# Patient Record
Sex: MALE | Race: WHITE | NOT HISPANIC OR LATINO | Employment: FULL TIME | URBAN - METROPOLITAN AREA
[De-identification: names, ages, dates, MRNs, and addresses within clinical notes are randomized per-mention and may not be internally consistent; named-entity substitution may affect disease eponyms.]

---

## 2017-02-09 ENCOUNTER — OFFICE VISIT - RIVER FALLS (OUTPATIENT)
Dept: FAMILY MEDICINE | Facility: CLINIC | Age: 52
End: 2017-02-09

## 2017-02-09 ASSESSMENT — MIFFLIN-ST. JEOR: SCORE: 1923.53

## 2017-03-20 ENCOUNTER — OFFICE VISIT - RIVER FALLS (OUTPATIENT)
Dept: FAMILY MEDICINE | Facility: CLINIC | Age: 52
End: 2017-03-20

## 2017-03-28 ENCOUNTER — OFFICE VISIT - RIVER FALLS (OUTPATIENT)
Dept: FAMILY MEDICINE | Facility: CLINIC | Age: 52
End: 2017-03-28

## 2017-03-28 ASSESSMENT — MIFFLIN-ST. JEOR: SCORE: 1916.27

## 2017-10-19 ENCOUNTER — OFFICE VISIT - RIVER FALLS (OUTPATIENT)
Dept: FAMILY MEDICINE | Facility: CLINIC | Age: 52
End: 2017-10-19

## 2017-10-19 ASSESSMENT — MIFFLIN-ST. JEOR: SCORE: 1925.35

## 2019-01-09 ENCOUNTER — OFFICE VISIT - RIVER FALLS (OUTPATIENT)
Dept: FAMILY MEDICINE | Facility: CLINIC | Age: 54
End: 2019-01-09

## 2019-01-09 ASSESSMENT — MIFFLIN-ST. JEOR: SCORE: 1779.29

## 2019-01-10 LAB
BUN SERPL-MCNC: 13 MG/DL (ref 7–25)
BUN/CREAT RATIO - HISTORICAL: ABNORMAL (ref 6–22)
CALCIUM SERPL-MCNC: 9.9 MG/DL (ref 8.6–10.3)
CHLORIDE BLD-SCNC: 96 MMOL/L (ref 98–110)
CO2 SERPL-SCNC: 29 MMOL/L (ref 20–32)
CREAT SERPL-MCNC: 0.96 MG/DL (ref 0.7–1.33)
EGFRCR SERPLBLD CKD-EPI 2021: 91 ML/MIN/1.73M2
GLUCOSE BLD-MCNC: 328 MG/DL (ref 65–99)
POTASSIUM BLD-SCNC: 3.8 MMOL/L (ref 3.5–5.3)
SODIUM SERPL-SCNC: 135 MMOL/L (ref 135–146)

## 2019-01-23 ENCOUNTER — OFFICE VISIT - RIVER FALLS (OUTPATIENT)
Dept: FAMILY MEDICINE | Facility: CLINIC | Age: 54
End: 2019-01-23

## 2019-01-23 ASSESSMENT — MIFFLIN-ST. JEOR: SCORE: 1779.29

## 2019-01-24 LAB — HBA1C MFR BLD: 12.5 %

## 2019-06-11 ENCOUNTER — OFFICE VISIT - RIVER FALLS (OUTPATIENT)
Dept: FAMILY MEDICINE | Facility: CLINIC | Age: 54
End: 2019-06-11

## 2019-06-11 ASSESSMENT — MIFFLIN-ST. JEOR: SCORE: 1779.29

## 2019-09-19 ENCOUNTER — OFFICE VISIT - RIVER FALLS (OUTPATIENT)
Dept: FAMILY MEDICINE | Facility: CLINIC | Age: 54
End: 2019-09-19

## 2019-09-19 ASSESSMENT — MIFFLIN-ST. JEOR: SCORE: 1775.66

## 2019-09-20 LAB
A/G RATIO - HISTORICAL: 1.7 (ref 1–2.5)
ALBUMIN SERPL-MCNC: 4.4 GM/DL (ref 3.6–5.1)
ALP SERPL-CCNC: 43 UNIT/L (ref 40–115)
ALT SERPL W P-5'-P-CCNC: 33 UNIT/L (ref 9–46)
AST SERPL W P-5'-P-CCNC: 29 UNIT/L (ref 10–35)
BILIRUB SERPL-MCNC: 0.8 MG/DL (ref 0.2–1.2)
BUN SERPL-MCNC: 10 MG/DL (ref 7–25)
BUN/CREAT RATIO - HISTORICAL: ABNORMAL (ref 6–22)
CALCIUM SERPL-MCNC: 9.7 MG/DL (ref 8.6–10.3)
CHLORIDE BLD-SCNC: 97 MMOL/L (ref 98–110)
CHOLEST SERPL-MCNC: 202 MG/DL
CHOLEST/HDLC SERPL: 4.6 {RATIO}
CO2 SERPL-SCNC: 27 MMOL/L (ref 20–32)
CREAT SERPL-MCNC: 0.93 MG/DL (ref 0.7–1.33)
EGFRCR SERPLBLD CKD-EPI 2021: 93 ML/MIN/1.73M2
ERYTHROCYTE [DISTWIDTH] IN BLOOD BY AUTOMATED COUNT: 11.7 % (ref 11–15)
GLOBULIN: 2.6 (ref 1.9–3.7)
GLUCOSE BLD-MCNC: 103 MG/DL (ref 65–99)
HBA1C MFR BLD: 5.9 %
HCT VFR BLD AUTO: 48.8 % (ref 38.5–50)
HDLC SERPL-MCNC: 44 MG/DL
HGB BLD-MCNC: 16.6 GM/DL (ref 13.2–17.1)
LDLC SERPL CALC-MCNC: 127 MG/DL
MCH RBC QN AUTO: 31.6 PG (ref 27–33)
MCHC RBC AUTO-ENTMCNC: 34 GM/DL (ref 32–36)
MCV RBC AUTO: 93 FL (ref 80–100)
MICROALBUMIN UR-MCNC: 0.8 MG/DL
NONHDLC SERPL-MCNC: 158 MG/DL
PLATELET # BLD AUTO: 173 10*3/UL (ref 140–400)
PMV BLD: 8.9 FL (ref 7.5–12.5)
POTASSIUM BLD-SCNC: 4 MMOL/L (ref 3.5–5.3)
PROT SERPL-MCNC: 7 GM/DL (ref 6.1–8.1)
RBC # BLD AUTO: 5.25 10*6/UL (ref 4.2–5.8)
SODIUM SERPL-SCNC: 135 MMOL/L (ref 135–146)
TRIGL SERPL-MCNC: 194 MG/DL
WBC # BLD AUTO: 6.1 10*3/UL (ref 3.8–10.8)

## 2019-09-23 ENCOUNTER — COMMUNICATION - RIVER FALLS (OUTPATIENT)
Dept: FAMILY MEDICINE | Facility: CLINIC | Age: 54
End: 2019-09-23

## 2019-09-24 ENCOUNTER — COMMUNICATION - RIVER FALLS (OUTPATIENT)
Dept: FAMILY MEDICINE | Facility: CLINIC | Age: 54
End: 2019-09-24

## 2020-03-02 ENCOUNTER — OFFICE VISIT - RIVER FALLS (OUTPATIENT)
Dept: FAMILY MEDICINE | Facility: CLINIC | Age: 55
End: 2020-03-02

## 2020-03-02 ASSESSMENT — MIFFLIN-ST. JEOR: SCORE: 1775.66

## 2020-04-21 ENCOUNTER — COMMUNICATION - RIVER FALLS (OUTPATIENT)
Dept: FAMILY MEDICINE | Facility: CLINIC | Age: 55
End: 2020-04-21

## 2020-05-05 ENCOUNTER — OFFICE VISIT - RIVER FALLS (OUTPATIENT)
Dept: FAMILY MEDICINE | Facility: CLINIC | Age: 55
End: 2020-05-05

## 2020-06-29 ENCOUNTER — COMMUNICATION - RIVER FALLS (OUTPATIENT)
Dept: FAMILY MEDICINE | Facility: CLINIC | Age: 55
End: 2020-06-29

## 2020-07-06 ENCOUNTER — COMMUNICATION - RIVER FALLS (OUTPATIENT)
Dept: FAMILY MEDICINE | Facility: CLINIC | Age: 55
End: 2020-07-06

## 2021-03-12 ENCOUNTER — OFFICE VISIT - RIVER FALLS (OUTPATIENT)
Dept: FAMILY MEDICINE | Facility: CLINIC | Age: 56
End: 2021-03-12

## 2021-03-12 ASSESSMENT — MIFFLIN-ST. JEOR: SCORE: 1800.15

## 2021-03-13 LAB
A/G RATIO - HISTORICAL: 1.8 (ref 1–2.5)
ALBUMIN SERPL-MCNC: 4.5 GM/DL (ref 3.6–5.1)
ALP SERPL-CCNC: 41 UNIT/L (ref 35–144)
ALT SERPL W P-5'-P-CCNC: 35 UNIT/L (ref 9–46)
AST SERPL W P-5'-P-CCNC: 29 UNIT/L (ref 10–35)
BILIRUB SERPL-MCNC: 0.8 MG/DL (ref 0.2–1.2)
BUN SERPL-MCNC: 13 MG/DL (ref 7–25)
BUN/CREAT RATIO - HISTORICAL: ABNORMAL (ref 6–22)
CALCIUM SERPL-MCNC: 10 MG/DL (ref 8.6–10.3)
CHLORIDE BLD-SCNC: 98 MMOL/L (ref 98–110)
CHOLEST SERPL-MCNC: 222 MG/DL
CHOLEST/HDLC SERPL: 5.8 {RATIO}
CO2 SERPL-SCNC: 28 MMOL/L (ref 20–32)
CREAT SERPL-MCNC: 0.99 MG/DL (ref 0.7–1.33)
EGFRCR SERPLBLD CKD-EPI 2021: 86 ML/MIN/1.73M2
ERYTHROCYTE [DISTWIDTH] IN BLOOD BY AUTOMATED COUNT: 12.2 % (ref 11–15)
GLOBULIN: 2.5 (ref 1.9–3.7)
GLUCOSE BLD-MCNC: 140 MG/DL (ref 65–99)
HBA1C MFR BLD: 6.9 %
HCT VFR BLD AUTO: 47.6 % (ref 38.5–50)
HDLC SERPL-MCNC: 38 MG/DL
HGB BLD-MCNC: 16.6 GM/DL (ref 13.2–17.1)
LDLC SERPL CALC-MCNC: 140 MG/DL
MCH RBC QN AUTO: 32.1 PG (ref 27–33)
MCHC RBC AUTO-ENTMCNC: 34.9 GM/DL (ref 32–36)
MCV RBC AUTO: 92.1 FL (ref 80–100)
NONHDLC SERPL-MCNC: 184 MG/DL
PLATELET # BLD AUTO: 164 10*3/UL (ref 140–400)
PMV BLD: 9.8 FL (ref 7.5–12.5)
POTASSIUM BLD-SCNC: 3.8 MMOL/L (ref 3.5–5.3)
PROT SERPL-MCNC: 7 GM/DL (ref 6.1–8.1)
PSA SERPL-MCNC: 0.6 NG/ML
RBC # BLD AUTO: 5.17 10*6/UL (ref 4.2–5.8)
SODIUM SERPL-SCNC: 136 MMOL/L (ref 135–146)
TRIGL SERPL-MCNC: 284 MG/DL
WBC # BLD AUTO: 5 10*3/UL (ref 3.8–10.8)

## 2021-03-15 ENCOUNTER — COMMUNICATION - RIVER FALLS (OUTPATIENT)
Dept: FAMILY MEDICINE | Facility: CLINIC | Age: 56
End: 2021-03-15

## 2021-10-14 ENCOUNTER — OFFICE VISIT - RIVER FALLS (OUTPATIENT)
Dept: FAMILY MEDICINE | Facility: CLINIC | Age: 56
End: 2021-10-14

## 2021-10-18 ENCOUNTER — COMMUNICATION - RIVER FALLS (OUTPATIENT)
Dept: FAMILY MEDICINE | Facility: CLINIC | Age: 56
End: 2021-10-18

## 2021-10-25 ENCOUNTER — OFFICE VISIT - RIVER FALLS (OUTPATIENT)
Dept: FAMILY MEDICINE | Facility: CLINIC | Age: 56
End: 2021-10-25

## 2021-11-08 ENCOUNTER — OFFICE VISIT - RIVER FALLS (OUTPATIENT)
Dept: FAMILY MEDICINE | Facility: CLINIC | Age: 56
End: 2021-11-08

## 2022-01-17 ENCOUNTER — OFFICE VISIT - RIVER FALLS (OUTPATIENT)
Dept: FAMILY MEDICINE | Facility: CLINIC | Age: 57
End: 2022-01-17

## 2022-02-12 VITALS
OXYGEN SATURATION: 97 % | HEART RATE: 73 BPM | WEIGHT: 199.8 LBS | BODY MASS INDEX: 27.06 KG/M2 | SYSTOLIC BLOOD PRESSURE: 130 MMHG | HEIGHT: 72 IN | DIASTOLIC BLOOD PRESSURE: 88 MMHG

## 2022-02-12 VITALS
HEIGHT: 72 IN | HEART RATE: 65 BPM | OXYGEN SATURATION: 97 % | BODY MASS INDEX: 27.06 KG/M2 | DIASTOLIC BLOOD PRESSURE: 84 MMHG | BODY MASS INDEX: 27.06 KG/M2 | OXYGEN SATURATION: 96 % | HEIGHT: 72 IN | DIASTOLIC BLOOD PRESSURE: 86 MMHG | SYSTOLIC BLOOD PRESSURE: 132 MMHG | WEIGHT: 199.8 LBS | HEART RATE: 68 BPM | WEIGHT: 199.8 LBS | SYSTOLIC BLOOD PRESSURE: 128 MMHG

## 2022-02-12 VITALS
WEIGHT: 232 LBS | HEART RATE: 78 BPM | BODY MASS INDEX: 31.42 KG/M2 | DIASTOLIC BLOOD PRESSURE: 72 MMHG | SYSTOLIC BLOOD PRESSURE: 122 MMHG | OXYGEN SATURATION: 98 % | HEIGHT: 72 IN

## 2022-02-12 VITALS
DIASTOLIC BLOOD PRESSURE: 88 MMHG | OXYGEN SATURATION: 97 % | SYSTOLIC BLOOD PRESSURE: 136 MMHG | HEIGHT: 72 IN | HEART RATE: 71 BPM | BODY MASS INDEX: 26.95 KG/M2 | WEIGHT: 199 LBS

## 2022-02-12 VITALS
WEIGHT: 230 LBS | BODY MASS INDEX: 31.15 KG/M2 | TEMPERATURE: 97 F | HEART RATE: 72 BPM | HEIGHT: 72 IN | SYSTOLIC BLOOD PRESSURE: 130 MMHG | BODY MASS INDEX: 31.19 KG/M2 | DIASTOLIC BLOOD PRESSURE: 88 MMHG | HEART RATE: 88 BPM | WEIGHT: 230 LBS | SYSTOLIC BLOOD PRESSURE: 146 MMHG | DIASTOLIC BLOOD PRESSURE: 82 MMHG

## 2022-02-12 VITALS
HEART RATE: 61 BPM | SYSTOLIC BLOOD PRESSURE: 180 MMHG | BODY MASS INDEX: 26.16 KG/M2 | DIASTOLIC BLOOD PRESSURE: 119 MMHG | WEIGHT: 192.9 LBS

## 2022-02-12 VITALS
TEMPERATURE: 98.2 F | DIASTOLIC BLOOD PRESSURE: 74 MMHG | OXYGEN SATURATION: 97 % | HEIGHT: 72 IN | BODY MASS INDEX: 26.99 KG/M2 | SYSTOLIC BLOOD PRESSURE: 128 MMHG | WEIGHT: 199 LBS | HEIGHT: 72 IN | HEART RATE: 96 BPM | BODY MASS INDEX: 26.95 KG/M2

## 2022-02-12 VITALS
DIASTOLIC BLOOD PRESSURE: 86 MMHG | SYSTOLIC BLOOD PRESSURE: 148 MMHG | HEART RATE: 88 BPM | BODY MASS INDEX: 31.37 KG/M2 | HEIGHT: 72 IN | WEIGHT: 231.6 LBS

## 2022-02-12 VITALS
HEART RATE: 74 BPM | HEIGHT: 72 IN | BODY MASS INDEX: 27.68 KG/M2 | WEIGHT: 204.4 LBS | SYSTOLIC BLOOD PRESSURE: 182 MMHG | DIASTOLIC BLOOD PRESSURE: 115 MMHG

## 2022-02-15 ENCOUNTER — OFFICE VISIT - RIVER FALLS (OUTPATIENT)
Dept: FAMILY MEDICINE | Facility: CLINIC | Age: 57
End: 2022-02-15

## 2022-02-15 NOTE — PROGRESS NOTES
Patient:   JESE CALLEJAS            MRN: [id number]            FIN: [id number]               Age:   53 years     Sex:  Male     :  1965   Associated Diagnoses:   Type 2 diabetes mellitus; Left inguinal hernia   Author:   Santosh Leiva MD      Impression and Plan   Diagnosis     Type 2 diabetes mellitus (TOC93-EG E11.9).     Course:  Worsening.    Plan:       Diet: American Diabetes Association meal plan.    Orders     Orders   Charges (Evaluation and Management):  [id number] office outpatient visit 25 minutes (Charge) (Order): Quantity: 1, Type 2 diabetes mellitus  Left inguinal hernia.     Orders (Selected)   Prescriptions  Prescribed  atorvastatin 20 mg oral tablet: = 1 tab(s) ( 20 mg ), PO, Daily, # 30 tab(s), 5 Refill(s), Type: Maintenance, Pharmacy: [facility], 1 tab(s) Oral daily  metFORMIN 500 mg oral tablet: = 2 tab(s) ( 1,000 mg ), PO, BID, # 120 tab(s), 5 Refill(s), Type: Maintenance, Pharmacy: [facility], 2 tab(s) Oral bid.     Diagnosis     Left inguinal hernia (GKP71-IM K40.90).     Course:  Worsening.    Orders     Orders (Selected)   Outpatient Orders  Ordered  Referral (Request): 19 9:46:00 CST, Referred to: General Surgery, Referred to: Dr. Abraham Culver in [place], Left inguinal hernia.        Visit Information      Date of Service: 2019 08:52 am  Performing Location: [facility]  Encounter#: [id number]      Primary Care Provider (PCP):  Santosh Leiva MD    NPI# [id number]      Referring Provider:  Santosh Leiva MD, NPI# [id number]   Visit type:  New symptom.    Accompanied by:  No one.    Source of history:  Self.    History limitation:  None.       Chief Complaint   2019 8:57 AM CST    Pt her for bulge in abdomen        History of Present Illness             The patient presents for initial evaluation of diabetes symptoms.  The diabetes is characterized by increased thirst and frequent urination.  The severity of the diabetes symptom(s)  is mild.  The diabetes symptom(s) is constant.  The symptom(s) of diabetes has lasted for 12 month(s).  The context of the diabetes symptom(s): developed slowly.  There are no modifying factors.  Associated symptoms consist of visual disturbance and weight loss.  Glucose results: elevated and 328.  Lifestyle modification: diet, increased physical activity.  Medications: Metformin .  Additional pertinent history: last eye exam: 1/23/2019 and last podiatric foot exam: 1/23/2019.               The patient presents with groin lump.  The groin lump is located on the left side.  The groin lump is described as walnut-sized.  The severity of pain associated to the groin lump is mild.  The patient noticed the groin lump 2 week(s) ago.  There are no modifying factors.  Associated symptoms consist of none.        Review of Systems   Constitutional:  Negative.    Eye:  Negative.    Ear/Nose/Mouth/Throat:  Negative.    Respiratory:  Negative.    Cardiovascular:  Negative.    Gastrointestinal:  Negative.    Genitourinary:  Negative except as documented in history of present illness.    Hematology/Lymphatics:  Negative.    Endocrine:  Negative except as documented in history of present illness.    Immunologic:  Negative.    Musculoskeletal:  Negative.    Integumentary:  Negative.    Neurologic:  Negative.    Psychiatric:  Negative.    All other systems reviewed and negative      Health Status   Allergies:    Allergic Reactions (Selected)  Severity Not Documented  Amoxicillin (Rash)  Clindamycin (Rash)  Percocet (Nausea)   Medications:  (Selected)   Prescriptions  Prescribed  Flonase 50 mcg/inh nasal spray: = 2 spray(s), nasal, daily, # 3 EA, 3 Refill(s), Type: Maintenance, Pharmacy: [facility], 2 spray(s) Nasal daily  atorvastatin 20 mg oral tablet: = 1 tab(s) ( 20 mg ), PO, Daily, # 30 tab(s), 5 Refill(s), Type: Maintenance, Pharmacy: [facility], 1 tab(s) Oral daily  flunisolide 25 mcg/inh nasal spray: See  Instructions, Instructions: 2 SQUIRTS DAILY, # 25 mL, 3 Refill(s), ADRIANO, Type: Maintenance, Pharmacy: [facility], 2 SQUIRTS DAILY  hydrochlorothiazide-metoprolol 25 mg-100 mg oral tablet: 1 tab(s), po, daily, # 90 tab(s), 3 Refill(s), Type: Maintenance, Pharmacy: [facility], 1 tab(s) Oral daily  metFORMIN 500 mg oral tablet: = 2 tab(s) ( 1,000 mg ), PO, BID, # 120 tab(s), 5 Refill(s), Type: Maintenance, Pharmacy: [facility], 2 tab(s) Oral bid   Problem list:    All Problems (Selected)  Allergic Rhinitis / ICD-9-.9 / Confirmed  Depression / SNOMED CT 747234637 / Confirmed  Hypertension / ICD-9-.9 / Confirmed  Insomnia / ICD-9-.52 / Confirmed  Obesity / SNOMED CT 8275355055 / Probable  Tobacco abuse / ICD-9-.1 / Confirmed  Type 2 diabetes mellitus / SNOMED CT 071638332 / Confirmed      Histories   Past Medical History:    Active  Allergic Rhinitis (477.9)  Tobacco abuse (305.1)   Family History:    Hypertension  Mother     Procedure history:    Discectomy (SNOMED CT 3177169) in the month of 11/2003 at 38 Years.   Social History:        Alcohol Assessment: Current            Current, 3-5 times per week      Tobacco Assessment: Current            Current, Snuff                     Comments:                      11/27/2012 - Tania Mathis                     daily      Substance Abuse Assessment: Denies Substance Abuse      Exercise and Physical Activity Assessment: Regular exercise      Physical Examination   Vital Signs   1/23/2019 8:57 AM CST Peripheral Pulse Rate 68 bpm    Systolic Blood Pressure 132 mmHg  HI    Diastolic Blood Pressure 84 mmHg  HI    Mean Arterial Pressure 100 mmHg    BP Site Left arm    Oxygen Saturation 96 %      Measurements from flowsheet : Measurements   1/23/2019 8:57 AM CST Height Measured - Standard 72 in    Weight Measured - Standard 199.8 lb    BSA 2.14 m2    Body Mass Index 27.09 kg/m2  HI      General:  No acute distress.    Neck:  Supple,  No lymphadenopathy, No thyromegaly.    Respiratory:  Lungs are clear to auscultation, Respirations are non-labored, Breath sounds are equal, Symmetrical chest wall expansion.    Cardiovascular:  Normal rate, Regular rhythm, No murmur, No gallop, Good pulses equal in all extremities, Normal peripheral perfusion, No edema.    Gastrointestinal:  Soft, Non-tender, Non-distended, Normal bowel sounds, No organomegaly.    Genitourinary:  No scrotal tenderness, No urethral discharge.         Groin/ inguinal region: Left, Inguinal hernia, Mass ( Soft ).         Testes: Bilateral, Within normal limits.    Integumentary:  Warm, Dry, Pink.    Neurologic:  Alert, Oriented.    Psychiatric:  Cooperative, Appropriate mood & affect.       Health Maintenance

## 2022-02-15 NOTE — NURSING NOTE
CAGE Assessment Entered On:  1/23/2019 9:00 AM CST    Performed On:  1/23/2019 9:00 AM CST by Marta Artis CMA               Assessment   Have you ever felt you should cut down on your drinking :   No   Have people annoyed you by criticizing your drinking :   No   Have you ever felt bad or guilty about your drinking :   No   Have you ever taken a drink first thing in the morning to steady your nerves or get rid of a hangover (Eye-opener) :   No   CAGE Score :   0    Marta Artis CMA - 1/23/2019 9:00 AM CST

## 2022-02-15 NOTE — PROGRESS NOTES
Patient:   JESE CALLEJAS            MRN: [id number]            FIN: [id number]               Age:   54 years     Sex:  Male     :  1965   Associated Diagnoses:   Acute recurrent maxillary sinusitis   Author:   To Blackburn PA-C      Report Summary   Diagnosis  Acute recurrent maxillary sinusitis (OSR18-FL J01.01).  CoursePatient InstructionsOrders   Visit Information      Date of Service: 2020 10:12 am  Performing Location: [facility]  Encounter#: [id number]      Primary Care Provider (PCP):  Santosh Leiva MD    NPI# [id number]      Referring Provider:  To Blackburn PA-C    NPI# [id number]   Visit type:  New symptom.    Source of history:  Self.    Referral source:  Self.    History limitation:  None.       Chief Complaint   3/2/2020 10:13 AM CST    Pt here with sinus congestion and pain        History of Present Illness             The patient presents with sinus problem.  The sinus problem is located in the maxillary sinus.  The sinus problem is characterized by nasal congestion, rhinorrhea and facial pain.  The severity of the sinus problem is moderate.  The sinus problem is episodic, fluctuates in intensity and is worsening.  The sinus problem has lasted for 5 day(s).  Associated symptoms consist of cough and sore throat.  CC above noted and confirmed with the patient..        Review of Systems   Constitutional:  Negative except as documented in history of present illness.    Eye:  Negative.    Ear/Nose/Mouth/Throat:  Negative except as documented in history of present illness.    Respiratory:  Negative except as documented in history of present illness.       Health Status   Allergies:    Allergic Reactions (All)  Severity Not Documented  Amoxicillin (Rash)  Clindamycin (Rash)  Percocet (Nausea)   Medications:  (Selected)   Prescriptions  Prescribed  Flonase 50 mcg/inh nasal spray: = 2 spray(s), nasal, daily, # 3 EA, 1 Refill(s), Type: Maintenance, Pharmacy: [facility], 2 spray(s) Nasal daily  atorvastatin 20 mg oral tablet: = 1 tab(s) ( 20 mg ), PO, Daily, # 90 tab(s), 1 Refill(s), Type: Maintenance, Pharmacy: [facility], 1 tab(s) Oral daily  cefdinir 300 mg oral capsule: = 1 cap(s) ( 300 mg ), PO, q12hr, x 10 day(s), # 20 cap(s), 0 Refill(s), Type: Acute, Pharmacy: [facility], 1 cap(s) Oral q12 hrs,x10 day(s)  flunisolide 25 mcg/inh nasal spray: See Instructions, Instructions: 2 SQUIRTS DAILY, # 25 mL, 3 Refill(s), Type: Maintenance, Pharmacy: [facility], 2 SQUIRTS DAILY  hydrochlorothiazide-metoprolol 25 mg-100 mg oral tablet: 1 tab(s), po, daily, # 90 tab(s), 1 Refill(s), Type: Maintenance, Pharmacy: [facility], 1 tab(s) Oral daily  metFORMIN 500 mg oral tablet, extended release: = 2 tab(s) ( 1,000 mg ), Oral, bid, # 120 tab(s), 5 Refill(s), Type: Maintenance, Pharmacy: [facility], 2 tab(s) Oral bid,    Medications          *denotes recorded medication          atorvastatin 20 mg oral tablet: 20 mg, 1 tab(s), PO, Daily, 90 tab(s), 1 Refill(s).          cefdinir 300 mg oral capsule: 300 mg, 1 cap(s), PO, q12hr, for 10 day(s), 20 cap(s), 0 Refill(s).          flunisolide 25 mcg/inh nasal spray: See Instructions, 2 SQUIRTS DAILY, 25 mL, 3 Refill(s).          Flonase 50 mcg/inh nasal spray: 2 spray(s), nasal, daily, 3 EA, 1 Refill(s).          hydrochlorothiazide-metoprolol 25 mg-100 mg oral tablet: 1 tab(s), po, daily, 90 tab(s), 1 Refill(s).          metFORMIN 500 mg oral tablet, extended release: 1,000 mg, 2 tab(s), Oral, bid, 120 tab(s), 5 Refill(s).       Problem list:    All Problems  Type 2 diabetes mellitus / SNOMED CT 630560313 / Confirmed  Tobacco abuse / ICD-9-.1 / Confirmed  Obesity / SNOMED CT 1675239292 / Probable  Insomnia / ICD-9-.52 / Confirmed  Hypertension / ICD-9-.9 / Confirmed  Depression / SNOMED CT 227413793 / Confirmed  Allergic Rhinitis / ICD-9-.9 / Confirmed  Inactive: Increased Blood  Pressure (not Hypertension) / ICD-9-.2      Histories   Past Medical History:    Active  Allergic Rhinitis (477.9)  Tobacco abuse (305.1)   Family History:    Hypertension  Mother     Procedure history:    Repair of inguinal hernia (47416025) on 2/26/2019 at 53 Years.  Comments:  3/6/2019 12:42 PM CST - Dora Turpin  left  Discectomy (4316919) in the month of 11/2003 at 38 Years.   Social History:        Alcohol Assessment: Current            Current, 3-5 times per week      Tobacco Assessment: Current            Current, Snuff                     Comments:                      11/27/2012 - Tania Mathis                     daily      Substance Abuse Assessment: Denies Substance Abuse      Exercise and Physical Activity Assessment: Regular exercise        Physical Examination   Vital Signs   3/2/2020 10:13 AM CST Temperature Tympanic 98.2 DegF    Peripheral Pulse Rate 96 bpm    Systolic Blood Pressure 128 mmHg    Diastolic Blood Pressure 74 mmHg    Mean Arterial Pressure 92 mmHg    Oxygen Saturation 97 %      Measurements from flowsheet : Measurements   3/2/2020 10:13 AM CST Height Measured - Standard 72 in    Weight Measured - Standard 199 lb    BSA 2.14 m2    Body Mass Index 26.99 kg/m2  HI      General:  Alert and oriented, No acute distress.    Eye:  Pupils are equal, round and reactive to light, Extraocular movements are intact, Normal conjunctiva.    HENT:  Normocephalic, Tympanic membranes are clear, Oral mucosa is moist.         Nose: Both nostrils, Discharge ( Small amount, Green ).    Neck:  Supple, Non-tender, No lymphadenopathy.    Respiratory:  Lungs are clear to auscultation, Respirations are non-labored.    Cardiovascular:  Normal rate, Regular rhythm, No murmur.       Impression and Plan   Diagnosis     Acute recurrent maxillary sinusitis (VNH05-DU J01.01).     Course:  Worsening.    Patient Instructions:       Counseled: Patient, Regarding diagnosis, Regarding medications, Activity,  Verbalized understanding.    Orders     Orders (Selected)   Prescriptions  Prescribed  cefdinir 300 mg oral capsule: = 1 cap(s) ( 300 mg ), PO, q12hr, x 10 day(s), # 20 cap(s), 0 Refill(s), Type: Acute, Pharmacy: [facility], 1 cap(s) Oral q12 hrs,x10 day(s).     Take medicine as prescribed, side effects discussed.  Tylenol/ibuprofen for fever and discomfort.  Push fluids.  RTC if not improving in 36-48 hours, prior if concerns as we have discussed.

## 2022-02-15 NOTE — NURSING NOTE
Comprehensive Intake Entered On:  1/9/2019 8:59 AM CST    Performed On:  1/9/2019 8:55 AM CST by Marta Artis CMA               Summary   Chief Complaint :   Pt here for med ck   Weight Measured :   199.8 lb(Converted to: 199 lb 13 oz, 90.63 kg)    Height Measured :   72 in(Converted to: 6 ft 0 in, 182.88 cm)    Body Mass Index :   27.09 kg/m2 (HI)    Body Surface Area :   2.14 m2   Systolic Blood Pressure :   128 mmHg   Diastolic Blood Pressure :   86 mmHg (HI)    Mean Arterial Pressure :   100 mmHg   Peripheral Pulse Rate :   65 bpm   BP Site :   Right arm   Oxygen Saturation :   97 %   Race :      Languages :   English   Ethnicity :   Not  or    Marta Artis CMA - 1/9/2019 8:55 AM CST   Health Status   Allergies Verified? :   Yes   Medication History Verified? :   Yes   Medical History Verified? :   Yes   Pre-Visit Planning Status :   Completed   Tobacco Use? :   Current every day smoker   Tobacco Cessation Review :   Not ready to quit   Marta Artis CMA - 1/9/2019 8:55 AM CST   Consents   Consent for Immunization Exchange :   Consent Granted   Consent for Immunizations to Providers :   Consent Granted   Marta Artis CMA - 1/9/2019 8:55 AM CST   Meds / Allergies   (As Of: 1/9/2019 8:59:03 AM CST)   Allergies (Active)   amoxicillin  Estimated Onset Date:   Unspecified ; Reactions:   rash ; Created By:   Sarah Alston LPN; Reaction Status:   Active ; Substance:   amoxicillin ; Updated By:   Sarah Alstno LPN; Reviewed Date:   1/9/2019 8:57 AM CST      clindamycin  Estimated Onset Date:   Unspecified ; Reactions:   rash ; Created By:   Sarah Alston LPN; Reaction Status:   Active ; Category:   Drug ; Substance:   clindamycin ; Type:   Allergy ; Updated By:   Sarah Alston LPN; Reviewed Date:   1/9/2019 8:57 AM CST      percocet  Estimated Onset Date:   Unspecified ; Reactions:   nausea ; Created By:   Sarah Alston LPN; Reaction Status:   Active ; Substance:    percocet ; Updated By:   Sarah Alston LPN; Reviewed Date:   1/9/2019 8:57 AM CST        Medication List   (As Of: 1/9/2019 8:59:03 AM CST)   Prescription/Discharge Order    flunisolide 25 mcg/inh nasal spray  :   flunisolide 25 mcg/inh nasal spray ; Status:   Prescribed ; Ordered As Mnemonic:   flunisolide 25 mcg/inh nasal spray ; Simple Display Line:   See Instructions, 2 SQUIRTS DAILY, 25 mL ; Ordering Provider:   Santosh Leiva MD; Catalog Code:   flunisolide nasal ; Order Dt/Tm:   11/12/2018 10:32:39 AM          fluticasone nasal  :   fluticasone nasal ; Status:   Prescribed ; Ordered As Mnemonic:   Flonase 50 mcg/inh nasal spray ; Simple Display Line:   2 spray(s), nasal, daily, 3 EA, 3 Refill(s) ; Ordering Provider:   Santosh Leiva MD; Catalog Code:   fluticasone nasal ; Order Dt/Tm:   10/19/2017 3:19:44 PM          hydrochlorothiazide-metoprolol  :   hydrochlorothiazide-metoprolol ; Status:   Prescribed ; Ordered As Mnemonic:   hydrochlorothiazide-metoprolol 25 mg-100 mg oral tablet ; Simple Display Line:   1 tab(s), po, daily, 90 tab(s), 3 Refill(s) ; Ordering Provider:   Santosh Leiva MD; Catalog Code:   hydrochlorothiazide-metoprolol ; Order Dt/Tm:   10/19/2017 1:45:25 PM

## 2022-02-15 NOTE — LETTER
(Inserted Image. Unable to display)   May 21, 2021    JESE CALLEJAS  [address] [place] [id number]            Dear JESE,      Thank you for selecting [facility] for your healthcare needs.    Our records indicate you are due for the following services:     Clinical Support Staff (CSS)-Only Blood Pressure Check ~ Please stop in anytime to have your blood pressure rechecked. This is a free service and no appointment necessary.     So we can best determine if your medications are effective in lowering your blood pressure, please make sure your blood pressure medicine has been in your system for at least 1-2 hours prior to coming in.  We encourage you to avoid caffeine or other stimulants prior to having your blood pressure checked and come at a time when you are not feeling rushed.     If you check your blood pressure at home, please bring in your blood pressure monitor and home blood pressure readings.  We will check your machine for accuracy and also share your home readings with your Healthcare Provider.     (FYI   Regarding office visits: In some instances, a video visit or telephone visit may be offered as an option.)      To schedule an appointment or if you have further questions, please contact your clinic at [phone].      Powered by [employer] and [employer]    Sincerely,    [facility]

## 2022-02-15 NOTE — NURSING NOTE
CAGE Assessment Entered On:  3/12/2021 9:04 AM CST    Performed On:  3/12/2021 9:04 AM CST by Lauren Sy CMA               Assessment   Have you ever felt you should cut down on your drinking :   No   Have people annoyed you by criticizing your drinking :   No   Have you ever felt bad or guilty about your drinking :   No   Have you ever taken a drink first thing in the morning to steady your nerves or get rid of a hangover (Eye-opener) :   No   CAGE Score :   0    Lauren Sy CMA - 3/12/2021 9:04 AM CST

## 2022-02-15 NOTE — NURSING NOTE
Comprehensive Intake Entered On:  10/14/2021 9:35 AM CDT    Performed On:  10/14/2021 9:31 AM CDT by Lauren Sy CMA               Summary   Chief Complaint :   Discuss Anxiety   Weight Measured :   192.9 lb(Converted to: 192 lb 14 oz, 87.498 kg)    Systolic Blood Pressure :   180 mmHg (HI)    Diastolic Blood Pressure :   119 mmHg (HI)    Mean Arterial Pressure :   139 mmHg   Peripheral Pulse Rate :   61 bpm   BP Site :   Right arm   BP Method :   Electronic   HR Method :   Electronic   Race :      Languages :   English   Ethnicity :   Not  or    Lauren Sy CMA - 10/14/2021 9:31 AM CDT   Health Status   Allergies Verified? :   Yes   Medication History Verified? :   Yes   Medical History Verified? :   Yes   Tobacco Use? :   Never smoker   Lauren Sy CMA - 10/14/2021 9:31 AM CDT   Consents   Consent for Immunization Exchange :   Consent Granted   Consent for Immunizations to Providers :   Consent Granted   Lauren Sy CMA - 10/14/2021 9:31 AM CDT   Meds / Allergies   (As Of: 10/14/2021 9:35:44 AM CDT)   Allergies (Active)   amoxicillin  Estimated Onset Date:   Unspecified ; Reactions:   rash ; Created By:   Sarah Alston LPN; Reaction Status:   Active ; Substance:   amoxicillin ; Updated By:   Sarah Alston LPN; Reviewed Date:   10/14/2021 9:33 AM CDT      clindamycin  Estimated Onset Date:   Unspecified ; Reactions:   rash ; Created By:   Sarah Alston LPN; Reaction Status:   Active ; Category:   Drug ; Substance:   clindamycin ; Type:   Allergy ; Updated By:   Sarah Alston LPN; Reviewed Date:   10/14/2021 9:33 AM CDT      percocet  Estimated Onset Date:   Unspecified ; Reactions:   nausea ; Created By:   Sarah Alston LPN; Reaction Status:   Active ; Substance:   percocet ; Updated By:   Sarah Alston LPN; Reviewed Date:   10/14/2021 9:33 AM CDT        Medication List   (As Of: 10/14/2021 9:35:44 AM CDT)   Prescription/Discharge Order     hydrochlorothiazide-lisinopril  :   hydrochlorothiazide-lisinopril ; Status:   Prescribed ; Ordered As Mnemonic:   hydrochlorothiazide-lisinopril 25 mg-20 mg oral tablet ; Simple Display Line:   1 tab(s), Oral, daily, 90 tab(s), 3 Refill(s) ; Ordering Provider:   To Blackburn PA-C; Catalog Code:   hydrochlorothiazide-lisinopril ; Order Dt/Tm:   3/12/2021 9:07:41 AM CST          metFORMIN  :   metFORMIN ; Status:   Prescribed ; Ordered As Mnemonic:   metFORMIN 500 mg oral tablet, extended release ; Simple Display Line:   500 mg, 1 tab(s), Oral, bid, 180 tab(s), 3 Refill(s) ; Ordering Provider:   To Blackburn PA-C; Catalog Code:   metFORMIN ; Order Dt/Tm:   3/12/2021 9:08:28 AM CST

## 2022-02-15 NOTE — LETTER
(Inserted Image. Unable to display)           [address] [place] [address]  [phone]    March 15, 2021      JESE CALLEJAS      [address] [place] [phone]        Dear JESE,    Thank you for selecting [facility] for your healthcare needs. Below you will find the result of your recent test(s) done at our clinic.     Your diabetes is under great control. You should go back on your cholesterol medications. Let us know if you have further questions.      Result Name Current Result Previous Result Reference Range   Sodium Level (mmol/L)  136 3/12/2021  135 9/19/2019 135 - 146   Potassium Level (mmol/L)  3.8 3/12/2021  4.0 9/19/2019 3.5 - 5.3   Chloride Level (mmol/L)  98 3/12/2021 ((L)) 97 9/19/2019 98 - 110   CO2 Level (mmol/L)  28 3/12/2021  27 9/19/2019 20 - 32   Glucose Level (mg/dL) ((H)) 140 3/12/2021 ((H)) 103 9/19/2019 65 - 99   BUN (mg/dL)  13 3/12/2021  10 9/19/2019 7 - 25   Creatinine Level (mg/dL)  0.99 3/12/2021  0.93 9/19/2019 0.70 - 1.33   Calcium Level (mg/dL)  10.0 3/12/2021  9.7 9/19/2019 8.6 - 10.3   Bilirubin Total (mg/dL)  0.8 3/12/2021  0.8 9/19/2019 0.2 - 1.2   Alkaline Phosphatase (unit/L)  41 3/12/2021  43 9/19/2019 35 - 144   AST/SGOT (unit/L)  29 3/12/2021  29 9/19/2019 10 - 35   ALT/SGPT (unit/L)  35 3/12/2021  33 9/19/2019 9 - 46   Protein Total (gm/dL)  7.0 3/12/2021  7.0 9/19/2019 6.1 - 8.1   Albumin Level (gm/dL)  4.5 3/12/2021  4.4 9/19/2019 3.6 - 5.1   Globulin  2.5 3/12/2021  2.6 9/19/2019 1.9 - 3.7   A/G Ratio  1.8 3/12/2021  1.7 9/19/2019 1.0 - 2.5   Hgb A1c ((H)) 6.9 3/12/2021 ((H)) 5.9 9/19/2019  - <5.7   Cholesterol (mg/dL) ((H)) 222 3/12/2021 ((H)) 202 9/19/2019  - <200   Non-HDL Cholesterol ((H)) 184 3/12/2021 ((H)) 158 9/19/2019  - <130   HDL (mg/dL) ((L)) 38 3/12/2021  44 9/19/2019 > OR = 40 -    Cholesterol/HDL Ratio ((H)) 5.8 3/12/2021  4.6 9/19/2019  - <5.0   LDL ((H)) 140 3/12/2021 ((H)) 127 9/19/2019    Triglyceride (mg/dL)  ((H)) 284 3/12/2021 ((H)) 194 9/19/2019  - <150   PSA (ng/mL)  0.6 3/12/2021   - < OR = 4.0   WBC  5.0 3/12/2021  6.1 9/19/2019 3.8 - 10.8   RBC  5.17 3/12/2021  5.25 9/19/2019 4.20 - 5.80   Hgb (gm/dL)  16.6 3/12/2021  16.6 9/19/2019 13.2 - 17.1   Hct (%)  47.6 3/12/2021  48.8 9/19/2019 38.5 - 50.0   MCV (fL)  92.1 3/12/2021  93.0 9/19/2019 80.0 - 100.0   MCH (pg)  32.1 3/12/2021  31.6 9/19/2019 27.0 - 33.0   MCHC (gm/dL)  34.9 3/12/2021  34.0 9/19/2019 32.0 - 36.0   RDW (%)  12.2 3/12/2021  11.7 9/19/2019 11.0 - 15.0   Platelet  164 3/12/2021  173 9/19/2019 140 - 400   MPV (fL)  9.8 3/12/2021  8.9 9/19/2019 7.5 - 12.5       Please contact my practice at [phone] if you have any questions or concerns.      Sincerely,        Titi Gomez MD ,   Dora Marcial MD,   To Blackburn PA-C

## 2022-02-15 NOTE — NURSING NOTE
Hearing and Vision Screening Entered On:  9/19/2019 10:02 AM CDT    Performed On:  9/19/2019 10:02 AM CDT by Marta Artis CMA               Hearing and Vision Screening   Audiogram Result Right Ear :   Pass   Audiogram Result Left Ear :   Pass   Marta Artis CMA - 9/19/2019 10:02 AM CDT

## 2022-02-15 NOTE — TELEPHONE ENCOUNTER
Entered by Marta Artis CMA on August 12, 2020 4:14:12 PM CDT  ---------------------  From: Marta Artis CMA   To: [facility]    Sent: 8/12/2020 4:14:12 PM CDT  Subject: Medication Management     ** Submitted: **  Order:flunisolide nasal (flunisolide 25 mcg/inh nasal spray)  2 spray(s)  Nasal  daily  Qty:  25 mL        Days Supply:  30        Refills:  5          Substitutions Allowed     Route To Pharmacy - [facility]    Signed by Marta Artis CMA  8/12/2020 9:13:00 PM [facility]    ** Submitted: **  Complete:flunisolide nasal (flunisolide 25 mcg/inh nasal spray)   Signed by Marta Artis CMA  8/12/2020 9:14:00 PM [facility]    ** Not Approved:  **  flunisolide nasal (FLUNISOLIDE SPR 0.025%)  USE TWO (2) SPRAYS IN EACH NOSTRIL ONCE DAILY  Qty:  25 mL        Days Supply:  30        Refills:  0          Substitutions Allowed     Route To Pharmacy - [facility]   Signed by Marta Artis CMA            ------------------------------------------  From: [facility]  To: Santosh Leiva MD  Sent: August 12, 2020 3:31:31 PM CDT  Subject: Medication Management  Due: August 12, 2020 4:17:26 PM CDT     ** On Hold Pending Signature **     Drug: flunisolide nasal (flunisolide 25 mcg/inh nasal spray), USE TWO (2) SPRAYS IN EACH NOSTRIL ONCE DAILY  Quantity: 25 mL  Days Supply: 30  Refills: 0  Substitutions Allowed  Notes from Pharmacy:     Dispensed Drug: flunisolide nasal (flunisolide 25 mcg/inh nasal spray), USE TWO (2) SPRAYS IN EACH NOSTRIL ONCE DAILY  Quantity: 25 mL  Days Supply: 30  Refills: 0  Substitutions Allowed  Notes from Pharmacy:  ------------------------------------------

## 2022-02-15 NOTE — CARE COORDINATION
Pt appears on  [facility] chronic disease panel as out of parameters for elevated BP and no Statin.  RTC placed for CSS only Bp check.  May discuss Statin at next visit-noted ok to see [facility] yearly for med check.  Kandice Voss CMA.

## 2022-02-15 NOTE — TELEPHONE ENCOUNTER
Entered by Marta Artis CMA on September 24, 2019 4:22:24 PM CDT  ---------------------  From: Marta Artis CMA   To: [facility]    Sent: 9/24/2019 4:22:24 PM CDT  Subject: Medication Management     ** Submitted: **  Order:flunisolide nasal (flunisolide 25 mcg/inh nasal spray)  See Instructions  2 SQUIRTS DAILY  Qty:  25 mL        Days Supply:  30        Refills:  3          Substitutions Allowed     Route To [facility]    Signed by Marta Artis CMA  9/24/2019 4:22:00 PM    ** Not Approved:  **  flunisolide nasal (FLUNISOLIDE SPR 0.025%)  2 SQUIRTS DAILY  Qty:  25 mL        Days Supply:  30        Refills:  3          Substitutions Allowed     Route To [facility]   Signed by Marta Artis CMA            ** Patient matched by Marta Artis CMA on 9/24/2019 4:21:49 PM CDT **      ------------------------------------------  From: [facility]  To: Santosh Leiva MD  Sent: September 24, 2019 3:27:33 PM CDT  Subject: Medication Management  Due: September 25, 2019 3:27:33 PM CDT    ** On Hold Pending Signature **  Drug: flunisolide nasal (flunisolide 25 mcg/inh nasal spray)  2 SQUIRTS DAILY  Quantity: 25 mL       Days Supply: 30        Refills: 3  Substitutions Allowed  Notes from Pharmacy:     Dispensed Drug: flunisolide nasal (flunisolide 25 mcg/inh nasal spray)  2 SQUIRTS DAILY  Quantity: 25 mL       Days Supply: 30        Refills: 3  Substitutions Allowed  Notes from Pharmacy:   ------------------------------------------

## 2022-02-15 NOTE — PROGRESS NOTES
Patient:   JESE CALLEJAS            MRN: [id number]            FIN: [id number]               Age:   52 years     Sex:  Male     :  1965   Associated Diagnoses:   Hypertension   Author:   Santosh Leiva MD      Impression and Plan   Diagnosis     Hypertension ([id number] I10).     Course:  Progressing as expected, Well controlled.    Summary:  patient refuses blood work and cancer screenings at this time.    Orders     Orders   Charges (Evaluation and Management):  95881 office outpatient visit 15 minutes (Charge) (Order): Quantity: 1, Hypertension.     Orders (Selected)   Prescriptions  Prescribed  hydrochlorothiazide-metoprolol 25 mg-100 mg oral tablet: 1 tab(s), po, daily, # 90 tab(s), 3 Refill(s), Type: Maintenance, Pharmacy: [facility], 1 tab(s) po daily.        Visit Information      Date of Service: 10/19/2017 01:29 pm  Performing Location: [facility]  Encounter#: [id number]      Primary Care Provider (PCP):  Santosh Leiva MD    NPI# [id number]      Referring Provider:  Santosh Leiva MD# [id number]   Visit type:  Scheduled follow-up.    Accompanied by:  No one.    Source of history:  Self.    Referral source:  Self.    History limitation:  None.       Chief Complaint   Chief complaint discussed and confirmed correct.     10/19/2017 1:31 PM CDT   Pt here for med ck        History of Present Illness             The patient presents for follow-up evaluation of hypertension.  The quality of hypertension symptom(s) since the patient's last visit is described as being unchanged.  The severity of the hypertension symptom(s) since the last visit is moderate.  Since the patient's last visit, the timing/course of hypertension symptom(s) is constant.  Exacerbating factors consist of none.  Relieving factors consist of medication.  Associated symptoms consist of none.  Prior treatment consists of lifestyle modification (weight reduction, dietary sodium restriction, increased  physical activity, adoption of DASH eating plan).  Medical encounters: none.  Compliance problems: none.        Review of Systems   Constitutional:  Negative.    Eye:  Negative.    Ear/Nose/Mouth/Throat:  Negative.    Respiratory:  Negative.    Cardiovascular:  Negative.    Gastrointestinal:  Negative.    Genitourinary:  Negative.    Hematology/Lymphatics:  Negative.    Endocrine:  Negative.    Immunologic:  Negative.    Musculoskeletal:  Negative.    Integumentary:  Negative.    Neurologic:  Negative.    Psychiatric:  Negative.    All other systems reviewed and negative      Health Status   Allergies:    Allergic Reactions (Selected)  Severity Not Documented  Amoxicillin (Rash)  Clindamycin (Rash)  Percocet (Nausea)   Medications:  (Selected)   Prescriptions  Prescribed  flunisolide 25 mcg/inh nasal spray: 2 puff(s), nasal, daily, # 1 EA, 5 Refill(s), Type: Maintenance, Pharmacy: [facility], 2 puff(s) nasal daily  hydrochlorothiazide-metoprolol 25 mg-100 mg oral tablet: 1 tab(s), po, daily, # 90 tab(s), 3 Refill(s), Type: Maintenance, Pharmacy: [facility], 1 tab(s) po daily   Problem list:    All Problems  Allergic Rhinitis / ICD-9-.9 / Confirmed  Depression / SNOMED CT 441027642 / Confirmed  Hypertension / ICD-9-.9 / Confirmed  Insomnia / ICD-9-.52 / Confirmed  Obesity / SNOMED CT 0473740216 / Probable  Tobacco abuse / ICD-9-.1 / Confirmed  Inactive: Increased Blood Pressure (not Hypertension) / ICD-9-.2      Histories   Past Medical History:    Active  Allergic Rhinitis (477.9)  Tobacco abuse (305.1)   Family History:    Hypertension  Mother     Procedure history:    Discectomy (SNOMED CT 8098699) in the month of 11/2003 at 38 Years.   Social History:        Alcohol Assessment: Current            Current, 1-2 times per week      Tobacco Assessment: Current            Current, Snuff                     Comments:                      11/27/2012 - Tania Mathis                      daily      Substance Abuse Assessment: Denies Substance Abuse      Exercise and Physical Activity Assessment: Regular exercise        Physical Examination   Vital signs reviewed  and within acceptable limits    Vital Signs   10/19/2017 1:31 PM CDT Peripheral Pulse Rate 78 bpm    Pulse Site Radial artery    Systolic Blood Pressure 122 mmHg    Diastolic Blood Pressure 72 mmHg    Mean Arterial Pressure 89 mmHg    BP Site Right arm    Oxygen Saturation 98 %      Measurements from flowsheet : Measurements   10/19/2017 1:31 PM CDT Height Measured - Standard 72 in    Weight Measured - Standard 232 lb    BSA 2.31 m2    Body Mass Index 31.46 kg/m2      General:  No acute distress.    Neck:  Supple, No lymphadenopathy, No thyromegaly.    Respiratory:  Lungs are clear to auscultation, Respirations are non-labored, Breath sounds are equal, Symmetrical chest wall expansion.    Cardiovascular:  Normal rate, Regular rhythm, No murmur, No gallop, Good pulses equal in all extremities, Normal peripheral perfusion, No edema.    Gastrointestinal:  Soft, Non-tender, Non-distended, Normal bowel sounds, No organomegaly.    Integumentary:  Warm, Dry, Pink.    Neurologic:  Alert, Oriented.    Psychiatric:  Cooperative, Appropriate mood & affect.

## 2022-02-15 NOTE — PROGRESS NOTES
Patient:   JESE CALLEJAS            MRN: [id number]            FIN: [id number]               Age:   53 years     Sex:  Male     :  1965   Associated Diagnoses:   Cellulitis of left ear   Author:   Santosh Leiva MD      Impression and Plan   Diagnosis     Cellulitis of left ear ([id number] H60.12).     Course:  Worsening.    Orders     Orders   Charges (Evaluation and Management):  53854 office outpatient visit 15 minutes (Charge) (Order): Quantity: 1, Cellulitis of left ear.     Orders (Selected)   Prescriptions  Prescribed  cephalexin 500 mg oral capsule: = 1 cap(s) ( 500 mg ), PO, qid, # 40 cap(s), 0 Refill(s), Type: Maintenance, Pharmacy: [facility], 1 cap(s) Oral qid,x10 day(s).        Visit Information      Date of Service: 2019 11:30 am  Performing Location: [facility]  Encounter#: [id number]      Primary Care Provider (PCP):  Santosh Leiva MD    NPI# [id number]      Referring Provider:  Santosh Leiva MD# [id number]   Visit type:  New symptom.    Accompanied by:  No one.    Source of history:  Self.    History limitation:  None.       Chief Complaint   Chief complaint discussed and confirmed correct.     2019 11:35 AM CDT   Pt here for swollen ear from bug bite        History of Present Illness             The patient presents with rash.  The location of the rash is on the left, ear.  The rash is described as swollen, red, oozing and painful.  The severity of the symptom(s) associated to the rash is moderate.  The timing/ course of symptom(s) related to the rash is constant and worsening.  The rash has lasted for 3 day(s).  The context of the rash: occurred after insect bite.  The rash is spreading symmetrically.  There are no modifying factors.  Associated symptoms consist of none.        Review of Systems   Constitutional:  Negative.    Eye:  Negative.    Ear/Nose/Mouth/Throat:  Negative.    Respiratory:  Negative.    Cardiovascular:  Negative.     Gastrointestinal:  Negative.    Genitourinary:  Negative.    Hematology/Lymphatics:  Negative.    Endocrine:  Negative.    Immunologic:  Negative.    Musculoskeletal:  Negative.    Integumentary:  Negative except as documented in history of present illness.    Neurologic:  Negative.    Psychiatric:  Negative.    All other systems reviewed and negative      Health Status   Allergies:    Allergic Reactions (Selected)  Severity Not Documented  Amoxicillin (Rash)  Clindamycin (Rash)  Percocet (Nausea)   Medications:  (Selected)   Prescriptions  Prescribed  Flonase 50 mcg/inh nasal spray: = 2 spray(s), nasal, daily, # 3 EA, 3 Refill(s), Type: Maintenance, Pharmacy: [facility], 2 spray(s) Nasal daily  atorvastatin 20 mg oral tablet: = 1 tab(s) ( 20 mg ), PO, Daily, # 30 tab(s), 5 Refill(s), Type: Maintenance, Pharmacy: [facility], 1 tab(s) Oral daily  cephalexin 500 mg oral capsule: = 1 cap(s) ( 500 mg ), PO, qid, # 40 cap(s), 0 Refill(s), Type: Maintenance, Pharmacy: [facility], 1 cap(s) Oral qid,x10 day(s)  flunisolide 25 mcg/inh nasal spray: See Instructions, Instructions: 2 SQUIRTS DAILY, # 25 mL, 3 Refill(s), ADRIANO, Type: Maintenance, Pharmacy: [facility], 2 SQUIRTS DAILY  hydrochlorothiazide-metoprolol 25 mg-100 mg oral tablet: 1 tab(s), po, daily, # 90 tab(s), 3 Refill(s), Type: Maintenance, Pharmacy: [facility], 1 tab(s) Oral daily  metFORMIN 500 mg oral tablet: = 2 tab(s) ( 1,000 mg ), PO, BID, # 120 tab(s), 5 Refill(s), Type: Maintenance, Pharmacy: [facility], 2 tab(s) Oral bid   Problem list:    All Problems  Allergic Rhinitis / ICD-9-.9 / Confirmed  Depression / SNOMED CT 547563430 / Confirmed  Hypertension / ICD-9-.9 / Confirmed  Insomnia / ICD-9-.52 / Confirmed  Obesity / SNOMED CT 4362086305 / Probable  Tobacco abuse / ICD-9-.1 / Confirmed  Type 2 diabetes mellitus / SNOMED CT 650365705 / Confirmed  Inactive: Increased Blood Pressure  (not Hypertension) / ICD-9-.2      Histories   Past Medical History:    Active  Allergic Rhinitis (477.9)  Tobacco abuse (305.1)   Family History:    Hypertension  Mother     Procedure history:    Repair of inguinal hernia (SNOMED CT 02538941) performed by Abraham Culver MD on 2/26/2019 at 53 Years.  Comments:  3/6/2019 12:42 PM CST - CarmenAiram sauli  left  Discectomy (SNOMED CT 2465215) in the month of 11/2003 at 38 Years.   Social History:        Alcohol Assessment: Current            Current, 3-5 times per week      Tobacco Assessment: Current            Current, Snuff                     Comments:                      11/27/2012 - Tania Mathis                     daily      Substance Abuse Assessment: Denies Substance Abuse      Exercise and Physical Activity Assessment: Regular exercise      Physical Examination   Vital signs reviewed  and within acceptable limits    Vital Signs   6/11/2019 11:35 AM CDT Peripheral Pulse Rate 73 bpm    Systolic Blood Pressure 130 mmHg    Diastolic Blood Pressure 88 mmHg  HI    Mean Arterial Pressure 102 mmHg    Oxygen Saturation 97 %      Measurements from flowsheet : Measurements   6/11/2019 11:35 AM CDT Height Measured - Standard 72 in    Weight Measured - Standard 199.8 lb    BSA 2.14 m2    Body Mass Index 27.09 kg/m2  HI      General:  Alert and oriented X 3, No acute distress, Warm, Pink, Intact.         Appearance: Within normal limits, Well nourished, Calm.         Hydration: Within normal limits.         Psych: Within normal limits, Appropriate mood and affect, Cooperative, Normal judgment.    Integumentary:  left external ear swollen and red and tender to manipulation.  Insect bite master on superior aspect..

## 2022-02-15 NOTE — LETTER
(Inserted Image. Unable to display)         April 23, 2020      JESE CALLEJAS  [address] [place] [address] [id number]        Dear JESE,    You are due for medication check with Dr Leiva.  Telephone visits are available at this time.   Dr Leiva will refill your medication at that appointment.      Make sure that you schedule your appointment before you run out of medication to ensure you're not going without any medication.    You may call  [phone] to schedule your telephone visit with Dr Cali Mendes, TK with Dr Leiva

## 2022-02-15 NOTE — LETTER
(Inserted Image. Unable to display)         April 29, 2020      JESE CALLEJAS  [address] [place] [address] [id number]        Dear JESE,    You are due for medication check with Dr Leiva.  Telephone visits are available at this time.   Dr Leiva will refill your medication at that appointment.      Make sure that you schedule your appointment before you run out of medication to ensure you're not going without any medication.    You may call  [phone] to schedule your telephone visit with Dr Cali Mendes, TK with Dr Leiva

## 2022-02-15 NOTE — LETTER
(Inserted Image. Unable to display)   [facility] [address]  September 23, 2019      JESE CALLEJAS  [address] [place] [address] [phone]        Dear JESE,     Thank you for selecting [facility] for your healthcare needs. Below you will find the results of the recent tests done at our clinic.        All results are within acceptable limits. No treatment changes are recommended at this time.      Result Name Current Result Previous Result Reference Range   Sodium Level (mmol/L)  135 9/19/2019  135 1/9/2019 135 - 146   Potassium Level (mmol/L)  4.0 9/19/2019  3.8 1/9/2019 3.5 - 5.3   Chloride Level (mmol/L) ((L)) 97 9/19/2019 ((L)) 96 1/9/2019 98 - 110   CO2 Level (mmol/L)  27 9/19/2019  29 1/9/2019 20 - 32   Glucose Level (mg/dL) ((H)) 103 9/19/2019 ((H)) 328 1/9/2019 65 - 99   BUN (mg/dL)  10 9/19/2019  13 1/9/2019 7 - 25   Creatinine Level (mg/dL)  0.93 9/19/2019  0.96 1/9/2019 0.70 - 1.33   BUN/Creat Ratio  NOT APPLICABLE 9/19/2019  NOT APPLICABLE 1/9/2019 6 - 22   eGFR (mL/min/1.73m2)  93 9/19/2019  91 1/9/2019 > OR = 60 -    eGFR  (mL/min/1.73m2)  108 9/19/2019  105 1/9/2019 > OR = 60 -    Calcium Level (mg/dL)  9.7 9/19/2019  9.9 1/9/2019 8.6 - 10.3   Bilirubin Total (mg/dL)  0.8 9/19/2019  0.2 - 1.2   Alkaline Phosphatase (unit/L)  43 9/19/2019  40 - 115   AST/SGOT (unit/L)  29 9/19/2019  10 - 35   ALT/SGPT (unit/L)  33 9/19/2019  9 - 46   Protein Total (gm/dL)  7.0 9/19/2019  6.1 - 8.1   Albumin Level (gm/dL)  4.4 9/19/2019  3.6 - 5.1   Globulin  2.6 9/19/2019  1.9 - 3.7   A/G Ratio  1.7 9/19/2019  1.0 - 2.5   Hgb A1c ((H)) 5.9 9/19/2019 ((H)) 12.5 1/23/2019  - <5.7   Cholesterol (mg/dL) ((H)) 202 9/19/2019   - <200   Non-HDL Cholesterol ((H)) 158 9/19/2019   - <130   HDL (mg/dL)  44 9/19/2019  >40 -    Cholesterol/HDL Ratio  4.6 9/19/2019   - <5.0   LDL ((H)) 127 9/19/2019     Triglyceride (mg/dL) ((H)) 194 9/19/2019   - <150   U Microalbumin  (mg/dL)  0.8 9/19/2019  See Note: -    Microalbumin Comment  See comment 9/19/2019     WBC  6.1 9/19/2019  3.8 - 10.8   RBC  5.25 9/19/2019  4.20 - 5.80   Hgb (gm/dL)  16.6 9/19/2019  13.2 - 17.1   Hct (%)  48.8 9/19/2019  38.5 - 50.0   MCV (fL)  93.0 9/19/2019  80.0 - 100.0   MCH (pg)  31.6 9/19/2019  27.0 - 33.0   MCHC (gm/dL)  34.0 9/19/2019  32.0 - 36.0   RDW (%)  11.7 9/19/2019  11.0 - 15.0   Platelet  173 9/19/2019  140 - 400   MPV (fL)  8.9 9/19/2019  7.5 - 12.5       Please contact my practice at [phone]  if you have any questions or concerns.     Sincerely,        Santosh Leiva MD          What do your labs mean?  Below is a glossary of commonly ordered labs:  LDL   Bad Cholesterol   HDL   Good Cholesterol  AST/ALT   Liver Function   Cr/Creatinine   Kidney Function  Microalbumin   Kidney Function  BUN   Kidney Function  PSA   Prostate    TSH   Thyroid Hormone  HgbA1c   Diabetes Test   Hgb (Hemoglobin)   Red Blood Cells

## 2022-02-15 NOTE — TELEPHONE ENCOUNTER
Entered by Marta Artis CMA on January 09, 2019 11:50:13 AM CST  ---------------------  From: Marta Artis CMA   To: [facility]    Sent: 1/9/2019 11:50:13 AM CST  Subject: Medication Management     ** Submitted: **  Order:flunisolide nasal (flunisolide 25 mcg/inh nasal spray)  See Instructions  2 SQUIRTS DAILY  Qty:  25 mL        Days Supply:  0        Refills:  3          ADRIANO     Route To Pharmacy [facility]    Signed by Marta Artsi CMA  1/9/2019 11:49:00 AM    ** Not Approved:  **  flunisolide nasal (Flunisolide Solution 0.025 %)  2 SQUIRTS DAILY  Qty:  25 mL        Days Supply:  0        Refills:  0          ADRIANO     Route To [facility]   Signed by Marta Artis CMA            ** Patient matched by Marta Artis CMA on 1/9/2019 11:49:36 AM CST **      ------------------------------------------  From: [facility]  To: Santosh Leiva MD  Sent: January 9, 2019 9:36:50 AM CST  Subject: Medication Management  Due: January 10, 2019 9:36:50 AM CST    ** On Hold Pending Signature **  Drug: flunisolide nasal (flunisolide 25 mcg/inh nasal spray)  2 SQUIRTS DAILY  Quantity: 25 mL       Days Supply: 0         Refills: 0  Substitutions Allowed  Notes from Pharmacy:     Dispensed Drug: flunisolide nasal (flunisolide 25 mcg/inh nasal spray)  2 SQUIRTS DAILY  Quantity: 25 mL       Days Supply: 0         Refills: 0  Substitutions Allowed  Notes from Pharmacy:   ------------------------------------------

## 2022-02-15 NOTE — LETTER
(Inserted Image. Unable to display)   [facility] [address]  January 24, 2019      JESE CALLEJAS  [address] [place] [id number]        Dear JESE,     Thank you for selecting [facility] for your healthcare needs. Below you will find the results of the recent tests done at our clinic.     The A1c is very high at 12.5 (normal = 4.0 - 5.7).  I'm sure it will come down with our treatment plan.  Again, our target goal is around 7.0.      Result Name Current Result Reference Range   Hgb A1c ((H)) 12.5 1/23/2019  - <5.7       Please contact my practice at [phone]  if you have any questions or concerns.     Sincerely,        Santosh Leiva MD          What do your labs mean?  Below is a glossary of commonly ordered labs:  LDL   Bad Cholesterol   HDL   Good Cholesterol  AST/ALT   Liver Function   Cr/Creatinine   Kidney Function  Microalbumin   Kidney Function  BUN   Kidney Function  PSA   Prostate    TSH   Thyroid Hormone  HgbA1c   Diabetes Test   Hgb (Hemoglobin)   Red Blood Cells

## 2022-02-15 NOTE — TELEPHONE ENCOUNTER
---------------------  From: Ariella Crawford RN (Phone Messages Pool ([id number]_[facility]))   To: [facility] Message Pool ([id number]_[facility]);     Sent: 10/18/2021 10:16:47 AM CDT  Subject: anxiety/med making worse       PCP:  ANDRES      Time of Call:  10:09am       Person Calling:  pt  Phone number:  [phone]    Note:   Pt called in, stating he saw ANDRES last week for anxiety. Was prescribed new medications for anxiety and is making sxs worse. Having 'bad thoughts in my head'. Has taken Lorazepam and it makes him more jittery. He cannot sleep.    Pt transferred to scheduling for phone appt for anxiety.    Last office visit and reason: 10/1421 anxiety KAHnoted

## 2022-02-15 NOTE — TELEPHONE ENCOUNTER
Entered by Marta Artis CMA on April 21, 2020 4:26:03 PM CDT  ---------------------  From: Marta Artis CMA   To: [facility]    Sent: 4/21/2020 4:26:03 PM CDT  Subject: Medication Management     ** Submitted: **  Order:flunisolide nasal (flunisolide 25 mcg/inh nasal spray)  2 puff(s)  Nasal  daily  Qty:  25 mL        Refills:  0          Substitutions Allowed     Route To Pharmacy [facility]    Signed by Marta Artis CMA  4/21/2020 9:25:00 PM    ** Submitted: **  Complete:flunisolide nasal (flunisolide 25 mcg/inh nasal spray)   Signed by Marta Artis CMA  4/21/2020 9:25:00 PM    ** Not Approved:  **  flunisolide nasal (FLUNISOLIDE SPR 0.025%)  TWO (2) SQUIRTS DAILY  Qty:  25 mL        Days Supply:  30        Refills:  0          Substitutions Allowed     Route To [facility]   Signed by Marta Artis CMA            ** Patient matched by Marta Artis CMA on 4/21/2020 4:25:23 PM CDT **      ------------------------------------------  From: [facility]  To: Santosh Leiva MD  Sent: April 21, 2020 4:19:29 PM CDT  Subject: Medication Management  Due: April 22, 2020 4:19:29 PM CDT    ** On Hold Pending Signature **  Drug: flunisolide nasal (flunisolide 25 mcg/inh nasal spray)  TWO (2) SQUIRTS DAILY  Quantity: 25 mL  Days Supply: 30  Refills: 0  Substitutions Allowed  Notes from Pharmacy:     Dispensed Drug: flunisolide nasal (flunisolide 25 mcg/inh nasal spray)  TWO (2) SQUIRTS DAILY  Quantity: 25 mL  Days Supply: 30  Refills: 0  Substitutions Allowed  Notes from Pharmacy:   ------------------------------------------

## 2022-02-15 NOTE — PROGRESS NOTES
Patient:   JESE CALLEJAS            MRN: [id number]            FIN: [id number]               Age:   56 years     Sex:  Male     :  1965   Associated Diagnoses:   Depression; Insomnia; ETOH abuse; LUPE (generalized anxiety disorder)   Author:   To Blackburn PA-C      Report Summary   Diagnosis  Depression (LCX57-KU F33.1).  Patient InstructionsSummaryOrders   Visit Information      Date of Service: 2021 06:29 am  Performing Location: [facility]  Encounter#: [id number]      Primary Care Provider (PCP):  To Blackburn PA-C    NPI# [id number]      Referring Provider:  To Blackburn PA-C    NPI# [id number]   Visit type:  Telephone Encounter.    Source of history:  Patient.    Location of patient:  Home  Call Start Time:   1600   Call End Time:    1605      Chief Complaint   FU anxiety. Doing better      History of Present Illness   Today's visit was conducted via telephone due to the COVID-19 pandemic. Patient's consent to telephone visit was obtained and documented.      Reason for visit:  Quit ETOH. Doing some better. Not suicidal or homicidal. Relations with neighbor are improved. Sleeping is improved. . Declines counseling. Sounds better on phone than at previous visit here and as described in the ED note. Not taking the olanzapine. Back to work today. That went better. Not suicidal      Review of Systems   Constitutional:  Negative.    Psychiatric:  Negative except as documented in history of present illness.       Health Status   Allergies:    Allergic Reactions (Selected)  Severity Not Documented  Amoxicillin (Rash)  Clindamycin (Rash)  Percocet (Nausea)   Medications:  (Selected)   Prescriptions  Prescribed  Lexapro 10 mg oral tablet: = 1 tab(s) ( 10 mg ), Oral, daily, # 90 tab(s), 0 Refill(s), Type: Maintenance, Pharmacy: [facility], 1 tab(s) Oral daily, 72, in, 21 8:43:00 CST, Height Measured, 192.9, lb, 10/14/21 9:31:00 CDT, Weight Measured  OLANZapine 5 mg  oral tablet: = 1 tab(s) ( 5 mg ), Oral, daily, # 14 tab(s), 0 Refill(s), Type: Maintenance, Pharmacy: [facility], 1 tab(s) Oral daily,x14 day(s), 72, in, 03/12/21 8:43:00 CST, Height Measured, 192.9, lb, 10/14/21 9:31:00 CDT, Weight Measured  hydrOXYzine pamoate 25 mg oral capsule: = 1 cap(s) ( 25 mg ), Oral, qid, Instructions: PRN anxiety, # 40 cap(s), 0 Refill(s), Type: Maintenance, Pharmacy: [facility], 1 cap(s) Oral qid,Instr:PRN anxiety, 72, in, 03/12/21 8:43:00 CST, Height Measured, 192.9, lb, 10/14/21 9:31:00 CDT,...  hydrochlorothiazide-lisinopril 25 mg-20 mg oral tablet: 1 tab(s), Oral, daily, # 90 tab(s), 3 Refill(s), Type: Maintenance, Pharmacy: [facility], 1 tab(s) Oral daily, 72, in, 03/12/21 8:43:00 CST, Height Measured, 204.4, lb, 03/12/21 8:43:00 CST, Weight Measured  metFORMIN 500 mg oral tablet, extended release: = 1 tab(s) ( 500 mg ), Oral, bid, # 180 tab(s), 3 Refill(s), Type: Maintenance, Pharmacy: [facility], I did add lisinopril to his HCTZ, 1 tab(s) Oral bid, 72, in, 03/12/21 8:43:00 CST, Height Measured, 204.4, lb, 03/12/21 8:43:00 CST, Weight M...   Problem list:    All Problems (Selected)  Type 2 diabetes mellitus / SNOMED CT 756017118 / Confirmed  Tobacco abuse / ICD-9-.1 / Confirmed  Obesity / SNOMED CT 5593626421 / Probable  Insomnia / ICD-9-.52 / Confirmed  Hypertension / ICD-9-.9 / Confirmed  Depression / SNOMED CT 185553307 / Confirmed  Allergic Rhinitis / ICD-9-.9 / Confirmed      Histories   Past Medical History:    Active  Allergic Rhinitis (477.9)  Tobacco abuse (305.1)   Family History:    Hypertension  Mother     Procedure history:    Repair of inguinal hernia ([id number]) on 2/26/2019 at 53 Years.  Comments:  3/6/2019 12:42 PM [facility]A - Dora Turpin  left  Discectomy ([id number]) in the month of 11/2003 at 38 Years.   Social History:        Electronic Cigarette/Vaping Assessment            Electronic Cigarette Use: Never.       Alcohol Assessment: Current            Current, 3-5 times per week      Tobacco Assessment: Current            Never (less than 100 in lifetime)            Current, Snuff                     Comments:                      11/27/2012 - Tania Mathis                     daily      Substance Abuse Assessment: Denies Substance Abuse      Exercise and Physical Activity Assessment: Regular exercise        Health Maintenance      Recommendations     Pending (in the next year)        OverDue           DM - Microalbumin due  09/19/20  and every 1  year(s)           DM - HgbA1c due  06/12/21  and every 3  month(s)           Influenza Vaccine due  09/01/21  and every 1  year(s)        Due            Aspirin Therapy for Prevention of CVD and CRC due  11/08/21  and every 5  year(s)           Colorectal Cancer Screen (Colonoscopy) due  11/08/21  Variable frequency           Colorectal Cancer Screen (Occult Blood) due  11/08/21  Variable frequency           Colorectal Cancer Screen (Sigmoidoscopy) due  11/08/21  Variable frequency           DM - Communication with Managing Provider due  11/08/21  and every 1  year(s)           DM - Eye Exam due  11/08/21  and every 1  year(s)           Lung Cancer Screen due  11/08/21  and every 1  year(s)        Due In Future            Body Mass Index Check not due until  03/12/22  and every 1  year(s)           DM - Foot Exam not due until  03/12/22  and every 1  year(s)           Alcohol Misuse Screen not due until  03/12/22  and every 1  year(s)           Lipid Disorders Screen not due until  03/12/22  and every 1  year(s)           Type 2 Diabetes Mellitus Screen not due until  03/12/22  and every 1  year(s)           High Blood Pressure Screen not due until  10/14/22  and every 1  year(s)           Obesity Screen and Counseling not due until  10/14/22  and every 1  year(s)           Depression Screen not due until  10/14/22  and every 1  year(s)     Satisfied (in the past 1 year)         Satisfied            Alcohol Misuse Screen on  03/12/21.           Alcohol Misuse Screen on  03/12/21.           Body Mass Index Check on  03/12/21.           DM - Foot Exam on  03/12/21.           DM - HgbA1c on  03/12/21.           Depression Screen on  10/14/21.           Depression Screen on  10/14/21.           Depression Screen on  10/14/21.           Depression Screen on  03/12/21.           Depression Screen on  03/12/21.           Depression Screen on  03/12/21.           High Blood Pressure Screen on  10/14/21.           High Blood Pressure Screen on  03/12/21.           Lipid Disorders Screen on  03/12/21.           Lipid Disorders Screen on  03/12/21.           Lipid Disorders Screen on  03/12/21.           Lipid Disorders Screen on  03/12/21.           Obesity Screen and Counseling on  10/14/21.           Obesity Screen and Counseling on  03/12/21.           Type 2 Diabetes Mellitus Screen on  03/12/21.          Impression and Plan   Diagnosis     Depression (LIW93-LM F33.1).     Insomnia (CXP44-KD G47.00).     ETOH abuse (EWV41-MJ F10.10).     LUPE (generalized anxiety disorder) (WJI65-TO F41.1).     Patient Instructions:       Counseled: Patient, Regarding diagnosis, Regarding treatment, Regarding medications, Verbalized understanding.    Summary:  RTC in 3 months and PRN.    Orders     Orders (Selected)   Prescriptions  Prescribed  Lexapro 10 mg oral tablet: = 1 tab(s) ( 10 mg ), Oral, daily, # 90 tab(s), 0 Refill(s), Type: Maintenance, Pharmacy: [facility], 1 tab(s) Oral daily, 72, in, 03/12/21 8:43:00 CST, Height Measured, 192.9, lb, 10/14/21 9:31:00 CDT, Weight Measured  hydrOXYzine pamoate 25 mg oral capsule: = 1 cap(s) ( 25 mg ), Oral, qid, Instructions: PRN anxiety, # 40 cap(s), 0 Refill(s), Type: Maintenance, Pharmacy: [facility], 1 cap(s) Oral qid,Instr:PRN anxiety, 72, in, 03/12/21 8:43:00 CST, Height Measured, 192.9, lb, 10/14/21 9:31:00 CDT,....

## 2022-02-15 NOTE — NURSING NOTE
Comprehensive Intake Entered On:  1/23/2019 9:00 AM CST    Performed On:  1/23/2019 8:57 AM CST by Marta Artis CMA               Summary   Chief Complaint :   Pt her for bulge in abdomen   Weight Measured :   199.8 lb(Converted to: 199 lb 13 oz, 90.63 kg)    Height Measured :   72 in(Converted to: 6 ft 0 in, 182.88 cm)    Body Mass Index :   27.09 kg/m2 (HI)    Body Surface Area :   2.14 m2   Systolic Blood Pressure :   132 mmHg (HI)    Diastolic Blood Pressure :   84 mmHg (HI)    Mean Arterial Pressure :   100 mmHg   Peripheral Pulse Rate :   68 bpm   BP Site :   Left arm   Oxygen Saturation :   96 %   Race :      Languages :   English   Ethnicity :   Not  or    Marta Artis CMA - 1/23/2019 8:57 AM CST   Health Status   Allergies Verified? :   Yes   Medication History Verified? :   Yes   Medical History Verified? :   Yes   Pre-Visit Planning Status :   Completed   Tobacco Use? :   Current every day smoker   Marta Artis CMA - 1/23/2019 8:57 AM CST   Consents   Consent for Immunization Exchange :   Consent Granted   Consent for Immunizations to Providers :   Consent Granted   Marta Artis CMA - 1/23/2019 8:57 AM CST   Meds / Allergies   (As Of: 1/23/2019 9:00:08 AM CST)   Allergies (Active)   amoxicillin  Estimated Onset Date:   Unspecified ; Reactions:   rash ; Created By:   Sarah Alston LPN; Reaction Status:   Active ; Substance:   amoxicillin ; Updated By:   Sarah Alston LPN; Reviewed Date:   1/23/2019 8:57 AM CST      clindamycin  Estimated Onset Date:   Unspecified ; Reactions:   rash ; Created By:   Sarah Alston LPN; Reaction Status:   Active ; Category:   Drug ; Substance:   clindamycin ; Type:   Allergy ; Updated By:   Sarah Alston LPN; Reviewed Date:   1/23/2019 8:57 AM CST      percocet  Estimated Onset Date:   Unspecified ; Reactions:   nausea ; Created By:   Sarah Alston LPN; Reaction Status:   Active ; Substance:   percocet ; Updated By:    Sarah Alston LPN; Reviewed Date:   1/23/2019 8:57 AM CST        Medication List   (As Of: 1/23/2019 9:00:08 AM CST)   Prescription/Discharge Order    flunisolide nasal  :   flunisolide nasal ; Status:   Prescribed ; Ordered As Mnemonic:   flunisolide 25 mcg/inh nasal spray ; Simple Display Line:   See Instructions, 2 SQUIRTS DAILY, 25 mL, 3 Refill(s) ; Ordering Provider:   Santosh Leiva MD; Catalog Code:   flunisolide nasal ; Order Dt/Tm:   1/9/2019 11:49:44 AM          fluticasone nasal  :   fluticasone nasal ; Status:   Prescribed ; Ordered As Mnemonic:   Flonase 50 mcg/inh nasal spray ; Simple Display Line:   2 spray(s), nasal, daily, 3 EA, 3 Refill(s) ; Ordering Provider:   Santosh Leiva MD; Catalog Code:   fluticasone nasal ; Order Dt/Tm:   1/9/2019 9:10:51 AM          hydrochlorothiazide-metoprolol  :   hydrochlorothiazide-metoprolol ; Status:   Prescribed ; Ordered As Mnemonic:   hydrochlorothiazide-metoprolol 25 mg-100 mg oral tablet ; Simple Display Line:   1 tab(s), po, daily, 90 tab(s), 3 Refill(s) ; Ordering Provider:   Santosh Leiva MD; Catalog Code:   hydrochlorothiazide-metoprolol ; Order Dt/Tm:   1/9/2019 9:10:30 AM            Depression Screening   Feeling Down, Depressed, Hopeless :   Not at all   Little Interest - Pleasure in Activities :   Not at all   Initial Depression Screen Score :   0    Trouble Falling or Staying Asleep :   Not at all   Feeling Tired or Little Energy :   Not at all   Poor Appetite or Overeating :   Not at all   Feeling Bad About Yourself :   Not at all   Trouble Concentrating :   Not at all   Moving or Speaking Slowly :   Not at all   Thoughts Better Off Dead or Hurting Self :   Not at all   Detailed Depression Screen Score :   0    Total Depression Screen Score :   0    LUPE Difficulty with Work, Home, Others :   Not difficult at all   Marta Artis CMA - 1/23/2019 8:57 AM CST

## 2022-02-15 NOTE — PROGRESS NOTES
Patient:   JESE CALLEJAS            MRN: [id number]            FIN: [id number]               Age:   56 years     Sex:  Male     :  1965   Associated Diagnoses:   Abuse, drug or alcohol; Depression; LUPE (generalized anxiety disorder)   Author:   To Blackburn PA-C      Visit Information      Date of Service: 10/25/2021 11:41 am  Performing Location: [facility]  Encounter#: [id number]      Primary Care Provider (PCP):  To Blackburn PA-C    NPI# [id number]      Referring Provider:  To Blackburn PA-C, NPI# [id number]   Visit type:  Telephone Encounter.    Source of history:  Patient.    Location of patient:  Home  Call Start Time:   1640  Call End Time:    1645      Chief Complaint   FU anxiety. Doing better      History of Present Illness   Today's visit was conducted via telephone due to the COVID-19 pandemic. Patient's consent to telephone visit was obtained and documented.      Reason for visit:  Quit ETOH. Doing some better. Not suicidal or homicidal. Relations with neighbor are improved. Still not sleeping well. Needs more time from work. Declines counseling. Sounds better on phone than at previous visit here and as described in the ED note.       Review of Systems   Constitutional:  Negative.    Psychiatric:  Negative except as documented in history of present illness.       Impression and Plan   Diagnosis     Abuse, drug or alcohol (GQW60-XU F19.10).     Depression (FYJ12-QK F33.1).     LUPE (generalized anxiety disorder) (PPE14-AO F41.1).     Patient Instructions:       Counseled: Patient, Regarding diagnosis, Regarding treatment, Regarding medications, Activity, Verbalized understanding.    Summary:  Will fill out paperwork if he gets faxed to me. RTC in two weeks and PRN..    Orders     Orders (Selected)   Prescriptions  Prescribed  Lexapro 10 mg oral tablet: = 1 tab(s) ( 10 mg ), Oral, daily, # 30 tab(s), 0 Refill(s), Type: Maintenance, called to pharmacy (Rx)  OLANZapine 5  mg oral tablet: = 1 tab(s) ( 5 mg ), Oral, daily, # 14 tab(s), 0 Refill(s), Type: Maintenance, Pharmacy: [facility], 1 tab(s) Oral daily,x14 day(s), 72, in, 03/12/21 8:43:00 CST, Height Measured, 192.9, lb, 10/14/21 9:31:00 CDT, Weight Measured  Documented Medications  Documented  hydrOXYzine: 0 Refill(s), Type: Maintenance.        Health Status   Allergies:    Allergic Reactions (Selected)  Severity Not Documented  Amoxicillin (Rash)  Clindamycin (Rash)  Percocet (Nausea)   Medications:  (Selected)   Prescriptions  Prescribed  Lexapro 10 mg oral tablet: = 1 tab(s) ( 10 mg ), Oral, daily, # 30 tab(s), 0 Refill(s), Type: Maintenance, called to pharmacy (Rx)  OLANZapine 5 mg oral tablet: = 1 tab(s) ( 5 mg ), Oral, daily, # 14 tab(s), 0 Refill(s), Type: Maintenance, Pharmacy: [facility], 1 tab(s) Oral daily,x14 day(s), 72, in, 03/12/21 8:43:00 CST, Height Measured, 192.9, lb, 10/14/21 9:31:00 CDT, Weight Measured  hydrochlorothiazide-lisinopril 25 mg-20 mg oral tablet: 1 tab(s), Oral, daily, # 90 tab(s), 3 Refill(s), Type: Maintenance, Pharmacy: [facility], 1 tab(s) Oral daily, 72, in, 03/12/21 8:43:00 CST, Height Measured, 204.4, lb, 03/12/21 8:43:00 CST, Weight Measured  metFORMIN 500 mg oral tablet, extended release: = 1 tab(s) ( 500 mg ), Oral, bid, # 180 tab(s), 3 Refill(s), Type: Maintenance, Pharmacy: [facility], I did add lisinopril to his HCTZ, 1 tab(s) Oral bid, 72, in, 03/12/21 8:43:00 CST, Height Measured, 204.4, lb, 03/12/21 8:43:00 CST, Weight M...  Documented Medications  Documented  hydrOXYzine: 0 Refill(s), Type: Maintenance   Problem list:    All Problems  Type 2 diabetes mellitus / SNOMED CT 987619180 / Confirmed  Tobacco abuse / ICD-9-.1 / Confirmed  Obesity / SNOMED CT 9393317826 / Probable  Insomnia / ICD-9-.52 / Confirmed  Hypertension / ICD-9-.9 / Confirmed  Depression / SNOMED CT 653225061 / Confirmed  Allergic Rhinitis / ICD-9-.9 /  Confirmed  Inactive: Increased Blood Pressure (not Hypertension) / ICD-9-.2      Histories   Past Medical History:    Active  Allergic Rhinitis (477.9)  Tobacco abuse (305.1)   Family History:    Hypertension  Mother     Procedure history:    Repair of inguinal hernia ([id number]) on 2/26/2019 at 53 Years.  Comments:  3/6/2019 12:42 PM CST - Dora Turpin  left  Discectomy ([id number]) in the month of 11/2003 at 38 Years.   Social History:        Electronic Cigarette/Vaping Assessment            Electronic Cigarette Use: Never.      Alcohol Assessment: Current            Current, 3-5 times per week      Tobacco Assessment: Current            Never (less than 100 in lifetime)            Current, Snuff                     Comments:                      11/27/2012 - Tania Mathis                     daily      Substance Abuse Assessment: Denies Substance Abuse      Exercise and Physical Activity Assessment: Regular exercise        Health Maintenance      Recommendations     Pending (in the next year)        OverDue           DM - Microalbumin due  09/19/20  and every 1  year(s)           DM - HgbA1c due  06/12/21  and every 3  month(s)           Influenza Vaccine due  09/01/21  and every 1  year(s)        Due            Aspirin Therapy for Prevention of CVD and CRC due  10/25/21  and every 5  year(s)           Colorectal Cancer Screen (Colonoscopy) due  10/25/21  Variable frequency           Colorectal Cancer Screen (Occult Blood) due  10/25/21  Variable frequency           Colorectal Cancer Screen (Sigmoidoscopy) due  10/25/21  Variable frequency           DM - Communication with Managing Provider due  10/25/21  and every 1  year(s)           DM - Eye Exam due  10/25/21  and every 1  year(s)           Lung Cancer Screen due  10/25/21  and every 1  year(s)        Due In Future            Body Mass Index Check not due until  03/12/22  and every 1  year(s)           DM - Foot Exam not due until  03/12/22  and every  1  year(s)           Alcohol Misuse Screen not due until  03/12/22  and every 1  year(s)           Lipid Disorders Screen not due until  03/12/22  and every 1  year(s)           Type 2 Diabetes Mellitus Screen not due until  03/12/22  and every 1  year(s)           High Blood Pressure Screen not due until  10/14/22  and every 1  year(s)           Obesity Screen and Counseling not due until  10/14/22  and every 1  year(s)           Depression Screen not due until  10/14/22  and every 1  year(s)     Satisfied (in the past 1 year)        Satisfied            Alcohol Misuse Screen on  03/12/21.           Alcohol Misuse Screen on  03/12/21.           Body Mass Index Check on  03/12/21.           DM - Foot Exam on  03/12/21.           DM - HgbA1c on  03/12/21.           Depression Screen on  10/14/21.           Depression Screen on  10/14/21.           Depression Screen on  10/14/21.           Depression Screen on  03/12/21.           Depression Screen on  03/12/21.           Depression Screen on  03/12/21.           High Blood Pressure Screen on  10/14/21.           High Blood Pressure Screen on  03/12/21.           Lipid Disorders Screen on  03/12/21.           Lipid Disorders Screen on  03/12/21.           Lipid Disorders Screen on  03/12/21.           Lipid Disorders Screen on  03/12/21.           Obesity Screen and Counseling on  10/14/21.           Obesity Screen and Counseling on  03/12/21.           Type 2 Diabetes Mellitus Screen on  03/12/21.

## 2022-02-15 NOTE — NURSING NOTE
Comprehensive Intake Entered On:  6/11/2019 11:37 AM CDT    Performed On:  6/11/2019 11:35 AM CDT by Marta Artis CMA               Summary   Chief Complaint :   Pt here for swollen ear from bug bite   Weight Measured :   199.8 lb(Converted to: 199 lb 13 oz, 90.63 kg)    Height Measured :   72 in(Converted to: 6 ft 0 in, 182.88 cm)    Body Mass Index :   27.09 kg/m2 (HI)    Body Surface Area :   2.14 m2   Systolic Blood Pressure :   130 mmHg   Diastolic Blood Pressure :   88 mmHg (HI)    Mean Arterial Pressure :   102 mmHg   Peripheral Pulse Rate :   73 bpm   Oxygen Saturation :   97 %   Race :      Languages :   English   Ethnicity :   Not  or    Marta Artis CMA - 6/11/2019 11:35 AM CDT   Health Status   Allergies Verified? :   Yes   Medication History Verified? :   Yes   Medical History Verified? :   Yes   Pre-Visit Planning Status :   Completed   Tobacco Use? :   Current every day smoker   Marta Artis CMA - 6/11/2019 11:35 AM CDT   Consents   Consent for Immunization Exchange :   Consent Granted   Consent for Immunizations to Providers :   Consent Granted   Marta Artis CMA - 6/11/2019 11:35 AM CDT   Meds / Allergies   (As Of: 6/11/2019 11:37:59 AM CDT)   Allergies (Active)   amoxicillin  Estimated Onset Date:   Unspecified ; Reactions:   rash ; Created By:   Sarah Alston LPN; Reaction Status:   Active ; Substance:   amoxicillin ; Updated By:   Sarah Alston LPN; Reviewed Date:   6/11/2019 11:36 AM CDT      clindamycin  Estimated Onset Date:   Unspecified ; Reactions:   rash ; Created By:   Sarah Alston LPN; Reaction Status:   Active ; Category:   Drug ; Substance:   clindamycin ; Type:   Allergy ; Updated By:   Sarah Alston LPN; Reviewed Date:   6/11/2019 11:36 AM CDT      percocet  Estimated Onset Date:   Unspecified ; Reactions:   nausea ; Created By:   Sarah Alston LPN; Reaction Status:   Active ; Substance:   percocet ; Updated By:   Gamaliel HUI  Sarah; Reviewed Date:   6/11/2019 11:36 AM CDT        Medication List   (As Of: 6/11/2019 11:37:59 AM CDT)   Prescription/Discharge Order    atorvastatin  :   atorvastatin ; Status:   Prescribed ; Ordered As Mnemonic:   atorvastatin 20 mg oral tablet ; Simple Display Line:   20 mg, 1 tab(s), PO, Daily, 30 tab(s), 5 Refill(s) ; Ordering Provider:   Santosh Leiva MD; Catalog Code:   atorvastatin ; Order Dt/Tm:   1/23/2019 9:45:15 AM          flunisolide nasal  :   flunisolide nasal ; Status:   Prescribed ; Ordered As Mnemonic:   flunisolide 25 mcg/inh nasal spray ; Simple Display Line:   See Instructions, 2 SQUIRTS DAILY, 25 mL, 3 Refill(s) ; Ordering Provider:   Santosh Leiva MD; Catalog Code:   flunisolide nasal ; Order Dt/Tm:   1/9/2019 11:49:44 AM          fluticasone nasal  :   fluticasone nasal ; Status:   Prescribed ; Ordered As Mnemonic:   Flonase 50 mcg/inh nasal spray ; Simple Display Line:   2 spray(s), nasal, daily, 3 EA, 3 Refill(s) ; Ordering Provider:   Santosh Leiva MD; Catalog Code:   fluticasone nasal ; Order Dt/Tm:   1/9/2019 9:10:51 AM          hydrochlorothiazide-metoprolol  :   hydrochlorothiazide-metoprolol ; Status:   Prescribed ; Ordered As Mnemonic:   hydrochlorothiazide-metoprolol 25 mg-100 mg oral tablet ; Simple Display Line:   1 tab(s), po, daily, 90 tab(s), 3 Refill(s) ; Ordering Provider:   Santosh Leiva MD; Catalog Code:   hydrochlorothiazide-metoprolol ; Order Dt/Tm:   1/9/2019 9:10:30 AM          metFORMIN  :   metFORMIN ; Status:   Prescribed ; Ordered As Mnemonic:   metFORMIN 500 mg oral tablet ; Simple Display Line:   1,000 mg, 2 tab(s), PO, BID, 120 tab(s), 5 Refill(s) ; Ordering Provider:   Santosh Leiva MD; Catalog Code:   metFORMIN ; Order Dt/Tm:   1/23/2019 9:44:49 AM

## 2022-02-15 NOTE — NURSING NOTE
Comprehensive Intake Entered On:  3/2/2020 10:16 AM CST    Performed On:  3/2/2020 10:13 AM CST by Marta Artis CMA               Summary   Chief Complaint :   Pt here with sinus congestion and pain   Weight Measured :   199 lb(Converted to: 199 lb 0 oz, 90.26 kg)    Height Measured :   72 in(Converted to: 6 ft 0 in, 182.88 cm)    Body Mass Index :   26.99 kg/m2 (HI)    Body Surface Area :   2.14 m2   Systolic Blood Pressure :   128 mmHg   Diastolic Blood Pressure :   74 mmHg   Mean Arterial Pressure :   92 mmHg   Peripheral Pulse Rate :   96 bpm   Temperature Tympanic :   98.2 DegF(Converted to: 36.8 DegC)    Oxygen Saturation :   97 %   Race :      Languages :   English   Ethnicity :   Not  or    Marta Artis CMA - 3/2/2020 10:13 AM CST   Health Status   Allergies Verified? :   Yes   Medication History Verified? :   Yes   Medical History Verified? :   Yes   Pre-Visit Planning Status :   Completed   Tobacco Use? :   Current every day smoker   Marta Artis CMA - 3/2/2020 10:13 AM CST   Consents   Consent for Immunization Exchange :   Consent Granted   Consent for Immunizations to Providers :   Consent Granted   Marta Artis CMA - 3/2/2020 10:13 AM CST   Meds / Allergies   (As Of: 3/2/2020 10:16:30 AM CST)   Allergies (Active)   amoxicillin  Estimated Onset Date:   Unspecified ; Reactions:   rash ; Created By:   Sarah Alston LPN; Reaction Status:   Active ; Substance:   amoxicillin ; Updated By:   Sarah Alston LPN; Reviewed Date:   3/2/2020 10:14 AM CST      clindamycin  Estimated Onset Date:   Unspecified ; Reactions:   rash ; Created By:   Sarah Alston LPN; Reaction Status:   Active ; Category:   Drug ; Substance:   clindamycin ; Type:   Allergy ; Updated By:   Sarah Alston LPN; Reviewed Date:   3/2/2020 10:14 AM CST      percocet  Estimated Onset Date:   Unspecified ; Reactions:   nausea ; Created By:   Sarah Alston LPN; Reaction Status:   Active ;  Substance:   percocet ; Updated By:   Sarah Alston LPN; Reviewed Date:   3/2/2020 10:14 AM CST        Medication List   (As Of: 3/2/2020 10:16:30 AM CST)   Prescription/Discharge Order    atorvastatin  :   atorvastatin ; Status:   Prescribed ; Ordered As Mnemonic:   atorvastatin 20 mg oral tablet ; Simple Display Line:   20 mg, 1 tab(s), PO, Daily, 90 tab(s), 1 Refill(s) ; Ordering Provider:   Santosh Leiva MD; Catalog Code:   atorvastatin ; Order Dt/Tm:   9/19/2019 9:32:27 AM CDT          flunisolide nasal  :   flunisolide nasal ; Status:   Prescribed ; Ordered As Mnemonic:   flunisolide 25 mcg/inh nasal spray ; Simple Display Line:   See Instructions, 2 SQUIRTS DAILY, 25 mL, 3 Refill(s) ; Ordering Provider:   Santosh Leiva MD; Catalog Code:   flunisolide nasal ; Order Dt/Tm:   9/24/2019 4:22:13 PM CDT          fluticasone nasal  :   fluticasone nasal ; Status:   Prescribed ; Ordered As Mnemonic:   Flonase 50 mcg/inh nasal spray ; Simple Display Line:   2 spray(s), nasal, daily, 3 EA, 1 Refill(s) ; Ordering Provider:   Santosh Leiva MD; Catalog Code:   fluticasone nasal ; Order Dt/Tm:   9/19/2019 9:32:17 AM CDT          hydrochlorothiazide-metoprolol  :   hydrochlorothiazide-metoprolol ; Status:   Prescribed ; Ordered As Mnemonic:   hydrochlorothiazide-metoprolol 25 mg-100 mg oral tablet ; Simple Display Line:   1 tab(s), po, daily, 90 tab(s), 1 Refill(s) ; Ordering Provider:   Santosh Leiva MD; Catalog Code:   hydrochlorothiazide-metoprolol ; Order Dt/Tm:   9/19/2019 9:32:07 AM CDT          metFORMIN  :   metFORMIN ; Status:   Prescribed ; Ordered As Mnemonic:   metFORMIN 500 mg oral tablet, extended release ; Simple Display Line:   1,000 mg, 2 tab(s), Oral, bid, 120 tab(s), 5 Refill(s) ; Ordering Provider:   Santosh Leiva MD; Catalog Code:   metFORMIN ; Order Dt/Tm:   9/19/2019 9:31:33 AM SARAH

## 2022-02-15 NOTE — NURSING NOTE
Comprehensive Intake Entered On:  5/5/2020 4:09 PM CDT    Performed On:  5/5/2020 4:08 PM CDT by Marta Artis CMA               Summary   Chief Complaint :   verbal consent given for telelphone visit for med ck and refills   Height Measured :   72 in(Converted to: 6 ft 0 in, 182.88 cm)    Race :      Languages :   English   Ethnicity :   Not  or    Marta Artis CMA - 5/5/2020 4:08 PM CDT   Health Status   Allergies Verified? :   Yes   Medication History Verified? :   Yes   Medical History Verified? :   Yes   Tobacco Use? :   Current every day smoker   Marta Artis CMA - 5/5/2020 4:08 PM CDT   Consents   Consent for Immunization Exchange :   Consent Granted   Consent for Immunizations to Providers :   Consent Granted   Marta Artis CMA - 5/5/2020 4:08 PM CDT   Meds / Allergies   (As Of: 5/5/2020 4:09:49 PM CDT)   Allergies (Active)   amoxicillin  Estimated Onset Date:   Unspecified ; Reactions:   rash ; Created By:   Sarah Alston LPN; Reaction Status:   Active ; Substance:   amoxicillin ; Updated By:   Sarah Alston LPN; Reviewed Date:   5/5/2020 4:09 PM CDT      clindamycin  Estimated Onset Date:   Unspecified ; Reactions:   rash ; Created By:   Sarah Alston LPN; Reaction Status:   Active ; Category:   Drug ; Substance:   clindamycin ; Type:   Allergy ; Updated By:   Sarah Alston LPN; Reviewed Date:   5/5/2020 4:09 PM CDT      percocet  Estimated Onset Date:   Unspecified ; Reactions:   nausea ; Created By:   Sarah Alston LPN; Reaction Status:   Active ; Substance:   percocet ; Updated By:   Sarah Alston LPN; Reviewed Date:   5/5/2020 4:09 PM CDT        Medication List   (As Of: 5/5/2020 4:09:49 PM CDT)   Prescription/Discharge Order    atorvastatin  :   atorvastatin ; Status:   Prescribed ; Ordered As Mnemonic:   atorvastatin 20 mg oral tablet ; Simple Display Line:   20 mg, 1 tab(s), PO, Daily, 90 tab(s), 1 Refill(s) ; Ordering Provider:   Cali ROLDAN,  Santosh; Catalog Code:   atorvastatin ; Order Dt/Tm:   9/19/2019 9:32:27 AM CDT          flunisolide nasal  :   flunisolide nasal ; Status:   Prescribed ; Ordered As Mnemonic:   flunisolide 25 mcg/inh nasal spray ; Simple Display Line:   2 puff(s), Nasal, daily, 25 mL, 0 Refill(s) ; Ordering Provider:   Santosh Leiva MD; Catalog Code:   flunisolide nasal ; Order Dt/Tm:   4/21/2020 4:25:34 PM CDT          fluticasone nasal  :   fluticasone nasal ; Status:   Prescribed ; Ordered As Mnemonic:   Flonase 50 mcg/inh nasal spray ; Simple Display Line:   2 spray(s), nasal, daily, 3 EA, 1 Refill(s) ; Ordering Provider:   Santosh Leiva MD; Catalog Code:   fluticasone nasal ; Order Dt/Tm:   9/19/2019 9:32:17 AM CDT          hydrochlorothiazide-metoprolol  :   hydrochlorothiazide-metoprolol ; Status:   Prescribed ; Ordered As Mnemonic:   hydrochlorothiazide-metoprolol 25 mg-100 mg oral tablet ; Simple Display Line:   1 tab(s), po, daily, 90 tab(s), 1 Refill(s) ; Ordering Provider:   Santosh Leiva MD; Catalog Code:   hydrochlorothiazide-metoprolol ; Order Dt/Tm:   9/19/2019 9:32:07 AM CDT          metFORMIN  :   metFORMIN ; Status:   Prescribed ; Ordered As Mnemonic:   metFORMIN 500 mg oral tablet, extended release ; Simple Display Line:   1,000 mg, 2 tab(s), Oral, bid, 120 tab(s), 5 Refill(s) ; Ordering Provider:   Santosh Leiva MD; Catalog Code:   metFORMIN ; Order Dt/Tm:   9/19/2019 9:31:33 AM CDT            ID Risk Screen   Recent Travel History :   No recent travel   Family Member Travel History :   No recent travel   Other Exposure to Infectious Disease :   Unknown   Marta Artis CMA - 5/5/2020 4:08 PM CDT

## 2022-02-15 NOTE — PROGRESS NOTES
Patient:   JESE CALLEJAS            MRN: [id number]            FIN: [id number]               Age:   53 years     Sex:  Male     :  1965   Associated Diagnoses:   Hypertension   Author:   Santosh Leiva MD      Impression and Plan   Diagnosis     Hypertension (TFV79-MM I10).     Course:  Progressing as expected, Well controlled.    Orders     Orders   Charges (Evaluation and Management):  73631 office outpatient visit 15 minutes (Charge) (Order): Quantity: 1, Hypertension.     Orders (Selected)   Outpatient Orders  Ordered (Dispatched)  Basic Metabolic Panel* ([facility]): Specimen Type: Serum, Collection Date: 19 9:10:00 CST  Prescriptions  Prescribed  Flonase 50 mcg/inh nasal spray: = 2 spray(s), nasal, daily, # 3 EA, 3 Refill(s), Type: Maintenance, Pharmacy: [facility], 2 spray(s) Nasal daily  hydrochlorothiazide-metoprolol 25 mg-100 mg oral tablet: 1 tab(s), po, daily, # 90 tab(s), 3 Refill(s), Type: Maintenance, Pharmacy: [facility], 1 tab(s) Oral daily.        Visit Information      Date of Service: 2019 08:52 am  Performing Location: [facility]  Encounter#: [id number]      Primary Care Provider (PCP):  Santosh Leiva MD    NPI# [id number]      Referring Provider:  Santosh Leiva MD    NPI# [id number]   Visit type:  Scheduled follow-up.    Accompanied by:  No one.    Source of history:  Self.    Referral source:  Self.    History limitation:  None.       Chief Complaint   Chief complaint discussed and confirmed correct.     2019 8:55 AM CST     Pt here for med ck        History of Present Illness             The patient presents for follow-up evaluation of hypertension.  The quality of hypertension symptom(s) since the patient's last visit is described as being unchanged.  The severity of the hypertension symptom(s) since the last visit is moderate.  Since the patient's last visit, the timing/course of hypertension symptom(s) is constant.  Exacerbating  factors consist of none.  Relieving factors consist of medication.  Associated symptoms consist of none.  Prior treatment consists of lifestyle modification (weight reduction, dietary sodium restriction, increased physical activity, adoption of DASH eating plan).  Medical encounters: none.  Compliance problems: none.        Review of Systems   Constitutional:  Negative.    Eye:  Negative.    Ear/Nose/Mouth/Throat:  Negative.    Respiratory:  Negative.    Cardiovascular:  Negative.    Gastrointestinal:  Negative.    Genitourinary:  Negative.    Hematology/Lymphatics:  Negative.    Endocrine:  Negative.    Immunologic:  Negative.    Musculoskeletal:  Negative.    Integumentary:  Negative.    Neurologic:  Negative.    Psychiatric:  Negative.    All other systems reviewed and negative      Health Status   Allergies:    Allergic Reactions (Selected)  Severity Not Documented  Amoxicillin (Rash)  Clindamycin (Rash)  Percocet (Nausea)   Medications:  (Selected)   Prescriptions  Prescribed  Flonase 50 mcg/inh nasal spray: = 2 spray(s), nasal, daily, # 3 EA, 3 Refill(s), Type: Maintenance, Pharmacy: [facility], 2 spray(s) Nasal daily  hydrochlorothiazide-metoprolol 25 mg-100 mg oral tablet: 1 tab(s), po, daily, # 90 tab(s), 3 Refill(s), Type: Maintenance, Pharmacy: [facility], 1 tab(s) Oral daily   Problem list:    All Problems  Allergic Rhinitis / ICD-9-.9 / Confirmed  Depression / SNOMED CT 283372021 / Confirmed  Hypertension / ICD-9-.9 / Confirmed  Insomnia / ICD-9-.52 / Confirmed  Obesity / SNOMED CT 0004200264 / Probable  Tobacco abuse / ICD-9-.1 / Confirmed  Inactive: Increased Blood Pressure (not Hypertension) / ICD-9-.2      Histories   Past Medical History:    Active  Allergic Rhinitis (477.9)  Tobacco abuse (305.1)   Family History:    Hypertension  Mother     Procedure history:    Discectomy (SNOMED CT 5018416) in the month of 11/2003 at 38 Years.   Social History:         Alcohol Assessment: Current            Current, 3-5 times per week      Tobacco Assessment: Current            Current, Snuff                     Comments:                      11/27/2012 - Tania Mathis                     daily      Substance Abuse Assessment: Denies Substance Abuse      Exercise and Physical Activity Assessment: Regular exercise      Physical Examination   Vital signs reviewed  and within acceptable limits    Vital Signs   1/9/2019 8:55 AM CST Peripheral Pulse Rate 65 bpm    Systolic Blood Pressure 128 mmHg    Diastolic Blood Pressure 86 mmHg  HI    Mean Arterial Pressure 100 mmHg    BP Site Right arm    Oxygen Saturation 97 %      Measurements from flowsheet : Measurements   1/9/2019 8:55 AM CST Height Measured - Standard 72 in    Weight Measured - Standard 199.8 lb    BSA 2.14 m2    Body Mass Index 27.09 kg/m2  HI      General:  No acute distress.    Neck:  Supple, No lymphadenopathy, No thyromegaly.    Respiratory:  Lungs are clear to auscultation, Respirations are non-labored, Breath sounds are equal, Symmetrical chest wall expansion.    Cardiovascular:  Normal rate, Regular rhythm, No murmur, No gallop, Good pulses equal in all extremities, Normal peripheral perfusion, No edema.    Gastrointestinal:  Soft, Non-tender, Non-distended, Normal bowel sounds, No organomegaly.    Integumentary:  Warm, Dry, Pink.    Neurologic:  Alert, Oriented.    Psychiatric:  Cooperative, Appropriate mood & affect.

## 2022-02-15 NOTE — PROGRESS NOTES
Patient:   JESE CALLEJAS            MRN: [id number]            FIN: [id number]               Age:   54 years     Sex:  Male     :  1965   Associated Diagnoses:   Allergic Rhinitis; Hypertension; Type 2 diabetes mellitus   Author:   Santosh Leiva MD      Impression and Plan   Diagnosis     Allergic Rhinitis (OMH13-BQ J30.9).     Hypertension (YGH10-FY I10).     Type 2 diabetes mellitus (RHU29-KL E11.9).     Course:  Improving.    Orders     Orders   Charges (Evaluation and Management):  72989 physician telephone evaluation 11-20 min (Charge) (Order): Quantity: 1, Allergic Rhinitis  Hypertension  Type 2 diabetes mellitus.     Orders (Selected)   Prescriptions  Prescribed  atorvastatin 20 mg oral tablet: = 1 tab(s) ( 20 mg ), PO, Daily, # 90 tab(s), 1 Refill(s), Type: Maintenance, Pharmacy: [facility], 1 tab(s) Oral daily  flunisolide 25 mcg/inh nasal spray: 2 puff(s), Nasal, daily, # 25 mL, 0 Refill(s), Type: Maintenance, Pharmacy: [facility], 2 puff(s) Nasal daily  hydrochlorothiazide-metoprolol 25 mg-100 mg oral tablet: 1 tab(s), po, daily, # 90 tab(s), 1 Refill(s), Type: Maintenance, Pharmacy: [facility], 1 tab(s) Oral daily  metFORMIN 500 mg oral tablet, extended release: = 2 tab(s) ( 1,000 mg ), Oral, bid, # 120 tab(s), 5 Refill(s), Type: Maintenance, Pharmacy: [facility], 2 tab(s) Oral bid.        Visit Information      Date of Service: 2020 11:43 am  Performing Location: [facility]  Encounter#: [id number]      Primary Care Provider (PCP):  Santosh Leiva MD    NPI# [id number]      Referring Provider:  Santosh Leiva MD, NPI# [id number]   Visit type:  Telephone Encounter.    Source of history:  Self.    Location of patient:  _home  Call Start Time:   _1630  Call End Time:    _1650   Referral source:  Self.    History limitation:  None.       Chief Complaint   Patient needs routine refill of regular medications   2020 4:08 PM CDT     verbal  consent given for telelphone visit for med ck and refills     _      History of Present Illness   Today's visit was conducted via telephone due to the COVID-19 pandemic.     Reason for visit:  _             The patient presents with Patient calls in for refills of his regular medications his medical problems include hypertension hyperlipidemia and diabetes mellitus type 2 he is feeling well and does not have any current medical complaints.  His blood sugars have been running between 100-110 in the morning.  He has not had a chance to have his blood pressure checked.  He denies visual symptoms.  He denies pedal skin lesions.  He intends to follow-up for a blood checkup later in the summer.  .        Review of Systems   Constitutional:  Negative.    Eye:  Negative.    Ear/Nose/Mouth/Throat:  Negative.    Respiratory:  Negative.    Cardiovascular:  Negative.    Gastrointestinal:  Negative.    Genitourinary:  Negative.    Hematology/Lymphatics:  Negative.    Endocrine:  Negative.    Immunologic:  Negative.    Musculoskeletal:  Negative.    Integumentary:  Negative.    Neurologic:  Negative.    Psychiatric:  Negative.    All other systems reviewed and negative      Health Status   Allergies:    Allergic Reactions (Selected)  Severity Not Documented  Amoxicillin (Rash)  Clindamycin (Rash)  Percocet (Nausea)   Medications:  (Selected)   Prescriptions  Prescribed  atorvastatin 20 mg oral tablet: = 1 tab(s) ( 20 mg ), PO, Daily, # 90 tab(s), 1 Refill(s), Type: Maintenance, Pharmacy: [facility], 1 tab(s) Oral daily  flunisolide 25 mcg/inh nasal spray: 2 puff(s), Nasal, daily, # 25 mL, 0 Refill(s), Type: Maintenance, Pharmacy: [facility], 2 puff(s) Nasal daily  hydrochlorothiazide-metoprolol 25 mg-100 mg oral tablet: 1 tab(s), po, daily, # 90 tab(s), 1 Refill(s), Type: Maintenance, Pharmacy: [facility], 1 tab(s) Oral daily  metFORMIN 500 mg oral tablet, extended release: = 2 tab(s) ( 1,000 mg ), Oral,  bid, # 120 tab(s), 5 Refill(s), Type: Maintenance, Pharmacy: [facility], 2 tab(s) Oral bid,    Medications          *denotes recorded medication          atorvastatin 20 mg oral tablet: 20 mg, 1 tab(s), PO, Daily, 90 tab(s), 1 Refill(s).          flunisolide 25 mcg/inh nasal spray: 2 puff(s), Nasal, daily, 25 mL, 0 Refill(s).          hydrochlorothiazide-metoprolol 25 mg-100 mg oral tablet: 1 tab(s), po, daily, 90 tab(s), 1 Refill(s).          metFORMIN 500 mg oral tablet, extended release: 1,000 mg, 2 tab(s), Oral, bid, 120 tab(s), 5 Refill(s).       Problem list:    All Problems  Allergic Rhinitis / ICD-9-.9 / Confirmed  Depression / SNOMED CT 957123012 / Confirmed  Hypertension / ICD-9-.9 / Confirmed  Insomnia / ICD-9-.52 / Confirmed  Obesity / SNOMED CT 6020078218 / Probable  Tobacco abuse / ICD-9-.1 / Confirmed  Type 2 diabetes mellitus / SNOMED CT 795249508 / Confirmed  Inactive: Increased Blood Pressure (not Hypertension) / ICD-9-.2      Histories   Past Medical History:    Active  Allergic Rhinitis (477.9)  Tobacco abuse (305.1)   Family History:    Hypertension  Mother     Procedure history:    Repair of inguinal hernia (SNOMED CT 80910362) performed by Abraham Culver MD on 2/26/2019 at 53 Years.  Comments:  3/6/2019 12:42 PM CST - Dora Turpin  left  Discectomy (SNOMED CT 8741127) in the month of 11/2003 at 38 Years.   Social History:        Alcohol Assessment: Current            Current, 3-5 times per week      Tobacco Assessment: Current            Current, Snuff                     Comments:                      11/27/2012 - Tania Mathis                     daily      Substance Abuse Assessment: Denies Substance Abuse      Exercise and Physical Activity Assessment: Regular exercise        Physical Examination   General:  Alert and oriented, No acute distress.    Respiratory:  Respirations are non-labored.    Psychiatric:  Cooperative, Appropriate mood &  affect, Normal judgment.       Health Maintenance      Recommendations     Pending (in the next year)        OverDue           DM - HgbA1c due  12/19/19  and every 3  month(s)           Depression Screen (Male) due  01/23/20  and every 1  year(s)           Alcohol Misuse Screen (Male) due  01/23/20  and every 1  year(s)        Due            Aspirin Therapy for Prevention of CVD (Male) due  05/05/20  and every 5  year(s)           Colorectal Cancer Screen (Colonoscopy) (Male) due  05/05/20  Variable frequency           Colorectal Cancer Screen (Occult Blood) (Male) due  05/05/20  Variable frequency           Colorectal Cancer Screen (Sigmoidoscopy) (Male) due  05/05/20  Variable frequency           DM - Communication with Managing Provider due  05/05/20  and every 1  year(s)           DM - Eye Exam due  05/05/20  and every 1  year(s)        Due In Future            Influenza Vaccine not due until  08/31/20  and every 1  year(s)           DM - Foot Exam not due until  09/19/20  and every 1  year(s)           DM - Microalbumin not due until  09/19/20  and every 1  year(s)           Lipid Disorders Screen (Male) not due until  09/19/20  and every 1  year(s)           Type 2 Diabetes Mellitus Screen (Male) not due until  09/19/20  and every 1  year(s)           Body Mass Index Check (Male) not due until  03/02/21  and every 1  year(s)           High Blood Pressure Screen (Male) not due until  03/02/21  and every 1  year(s)           Obesity Screen and Counseling (Male) not due until  03/02/21  and every 1  year(s)     Satisfied (in the past 1 year)        Satisfied            Body Mass Index Check (Male) on  03/02/20.           Body Mass Index Check (Male) on  09/19/19.           Body Mass Index Check (Male) on  06/11/19.           DM - Foot Exam on  09/19/19.           DM - HgbA1c on  09/19/19.           DM - Microalbumin on  09/19/19.           High Blood Pressure Screen (Male) on  03/02/20.           High Blood  Pressure Screen (Male) on  09/19/19.           High Blood Pressure Screen (Male) on  06/11/19.           Lipid Disorders Screen (Male) on  09/19/19.           Lipid Disorders Screen (Male) on  09/19/19.           Lipid Disorders Screen (Male) on  09/19/19.           Lipid Disorders Screen (Male) on  09/19/19.           Obesity Screen and Counseling (Male) on  03/02/20.           Obesity Screen and Counseling (Male) on  09/19/19.           Obesity Screen and Counseling (Male) on  06/11/19.           Type 2 Diabetes Mellitus Screen (Male) on  09/19/19.

## 2022-02-15 NOTE — PROGRESS NOTES
Patient:   JESE CALLEJAS            MRN: [id number]            FIN: [id number]               Age:   54 years     Sex:  Male     :  1965   Associated Diagnoses:   HLD (hyperlipidemia); Type 2 diabetes mellitus; Hypertension; Pain and swelling of right knee   Author:   Santosh Leiva MD      Impression and Plan   Diagnosis     HLD (hyperlipidemia) (DIY05-KH E78.5).     Course:  Progressing as expected.    Orders     Orders   Charges (Evaluation and Management):  75714 office outpatient visit 25 minutes (Charge) (Order): Quantity: 1, Hypertension  HLD (hyperlipidemia)  Type 2 diabetes mellitus.     Orders (Selected)   Prescriptions  Prescribed  atorvastatin 20 mg oral tablet: = 1 tab(s) ( 20 mg ), PO, Daily, # 90 tab(s), 1 Refill(s), Type: Maintenance, Pharmacy: [facility], 1 tab(s) Oral daily.     Diagnosis     Type 2 diabetes mellitus (AVU55-NY E11.9).     Course:  Well controlled.    Orders     Orders (Selected)   Prescriptions  Prescribed  metFORMIN 500 mg oral tablet, extended release: = 2 tab(s) ( 1,000 mg ), Oral, bid, # 120 tab(s), 5 Refill(s), Type: Maintenance, Pharmacy: [facility], 2 tab(s) Oral bid.     Diagnosis     Hypertension (FLF99-OX I10).     Course:  Well controlled.    Orders     Orders (Selected)   Prescriptions  Prescribed  hydrochlorothiazide-metoprolol 25 mg-100 mg oral tablet: 1 tab(s), po, daily, # 90 tab(s), 1 Refill(s), Type: Maintenance, Pharmacy: [facility], 1 tab(s) Oral daily.     Diagnosis     Pain and swelling of right knee (HBC76-VH M25.561).     Course:  Worsening.    Plan:  Rest Ice Ibuprofen.  If not improving follow up for corticosteroid injection.    Orders     Orders (Selected)   Outpatient Orders  Order  XR Knee AP/Lat Right (Request): Pain and swelling of right knee.        Visit Information      Date of Service: 2019 08:54 am  Performing Location: [facility]  Encounter#: [id number]      Primary Care Provider (PCP):   Cali ROLDAN, Santosh    [facility], [place]# [id number]   Visit type:  New symptom, Scheduled follow-up.    Accompanied by:  No one.    Source of history:  Self.    History limitation:  None.       Chief Complaint   9/19/2019 8:56 AM CDT    Pt here for med ck        History of Present Illness             The patient presents for follow-up evaluation of diabetes.  The quality of the patient's diabetes is described as being unchanged from previous visit.  Relieving factors consist of diet changes, increased activity and medication.  Associated symptoms consist of none.  Medical encounters: none.  Compliance problems: none.  Glucose results: within target range.  Hemoglobin A1c results: within target range.  Lifestyle modification: weight reduction, diet, increased physical activity.  Medications: see medication list .  Additional pertinent history:.               The patient presents for follow-up evaluation of hypertension.  The quality of hypertension symptom(s) since the patient's last visit is described as being unchanged.  The severity of the hypertension symptom(s) since the last visit is moderate.  Since the patient's last visit, the timing/course of hypertension symptom(s) is constant.  Exacerbating factors consist of none.  Relieving factors consist of medication.  Associated symptoms consist of none.  Prior treatment consists of lifestyle modification (weight reduction, dietary sodium restriction, increased physical activity, adoption of DASH eating plan).  Medical encounters: none.  Compliance problems: none.        Interval History   Cholesterol Management   Total cholesterol results < 200 mg/dL (optimal).  LDL cholesterol results < 100 mg/dL (optimal).  Triglyceride results < 150 mg/dL (normal).  The course is progressing as expected.  The effect on daily activities is no change in activity level and no change in eating habits.  Associated symptoms characterized by no fatigue, chest pain, joint pain, muscle weakness or  myalgias.        Review of Systems   Constitutional:  Negative.    Eye:  Negative.    Ear/Nose/Mouth/Throat:  Negative.    Respiratory:  Negative.    Cardiovascular:  Negative.    Gastrointestinal:  Negative.    Genitourinary:  Negative.    Hematology/Lymphatics:  Negative.    Endocrine:  Negative.    Immunologic:  Negative.    Musculoskeletal:  Joint pain.    Integumentary:  Negative.    Neurologic:  Negative.    Psychiatric:  Negative.    All other systems reviewed and negative      Health Status   Allergies:    Allergic Reactions (Selected)  Severity Not Documented  Amoxicillin (Rash)  Clindamycin (Rash)  Percocet (Nausea)   Medications:  (Selected)   Prescriptions  Prescribed  Flonase 50 mcg/inh nasal spray: = 2 spray(s), nasal, daily, # 3 EA, 1 Refill(s), Type: Maintenance, Pharmacy: [facility], 2 spray(s) Nasal daily  atorvastatin 20 mg oral tablet: = 1 tab(s) ( 20 mg ), PO, Daily, # 90 tab(s), 1 Refill(s), Type: Maintenance, Pharmacy: [facility], 1 tab(s) Oral daily  hydrochlorothiazide-metoprolol 25 mg-100 mg oral tablet: 1 tab(s), po, daily, # 90 tab(s), 1 Refill(s), Type: Maintenance, Pharmacy: [facility], 1 tab(s) Oral daily  metFORMIN 500 mg oral tablet, extended release: = 2 tab(s) ( 1,000 mg ), Oral, bid, # 120 tab(s), 5 Refill(s), Type: Maintenance, Pharmacy: [facility], 2 tab(s) Oral bid   Problem list:    All Problems (Selected)  Allergic Rhinitis / ICD-9-.9 / Confirmed  Depression / SNOMED CT 127111039 / Confirmed  Hypertension / ICD-9-.9 / Confirmed  Insomnia / ICD-9-.52 / Confirmed  Obesity / SNOMED CT 9418368925 / Probable  Tobacco abuse / ICD-9-.1 / Confirmed  Type 2 diabetes mellitus / SNOMED CT 574395134 / Confirmed      Histories   Past Medical History:    Active  Allergic Rhinitis (477.9)  Tobacco abuse (305.1)   Family History:    Hypertension  Mother     Procedure history:    Repair of inguinal hernia (SNOMED CT 37716771) performed  by Abraham Culver MD on 2/26/2019 at 53 Years.  Comments:  3/6/2019 12:42 PM CST - Dora Turpin  left  Discectomy (SNOMED CT 3179213) in the month of 11/2003 at 38 Years.   Social History:        Alcohol Assessment: Current            Current, 3-5 times per week      Tobacco Assessment: Current            Current, Snuff                     Comments:                      11/27/2012 - Tania Mathis                     daily      Substance Abuse Assessment: Denies Substance Abuse      Exercise and Physical Activity Assessment: Regular exercise        Physical Examination   Vital Signs   9/19/2019 8:56 AM CDT Peripheral Pulse Rate 71 bpm    Systolic Blood Pressure 136 mmHg  HI    Diastolic Blood Pressure 88 mmHg  HI    Mean Arterial Pressure 104 mmHg    Oxygen Saturation 97 %      Measurements from flowsheet : Measurements   9/19/2019 8:56 AM CDT Height Measured - Standard 72 in    Weight Measured - Standard 199 lb    BSA 2.14 m2    Body Mass Index 26.99 kg/m2  HI      General:  Alert and oriented, No acute distress.    HENT:  Normocephalic.    Neck:  Supple, No lymphadenopathy, No thyromegaly.    Respiratory:  Lungs are clear to auscultation, Respirations are non-labored, Breath sounds are equal, Symmetrical chest wall expansion.    Cardiovascular:  Normal rate, Regular rhythm, No murmur, No gallop, Good pulses equal in all extremities, Normal peripheral perfusion, No edema.    Gastrointestinal:  Soft, Non-tender, Non-distended, Normal bowel sounds, No organomegaly.    Musculoskeletal:  Normal range of motion, Normal gait, soft tissue swelling on the medial aspect of the right knee.    Integumentary:  Warm, Dry, Pink, No foot ulcers or skin lesions..    Feet:  Normal by visual exam, Sensation intact, By monofilament exam.    Neurologic:  Alert, Oriented, Normal sensory, Normal motor function, No focal deficits.    Psychiatric:  Cooperative, Appropriate mood & affect.

## 2022-02-15 NOTE — NURSING NOTE
Comprehensive Intake Entered On:  3/12/2021 8:49 AM CST    Performed On:  3/12/2021 8:43 AM CST by Lauren Sy CMA               Summary   Chief Complaint :   HTN/DM med check, due for labs   Weight Measured :   204.4 lb(Converted to: 204 lb 6 oz, 92.714 kg)    Height Measured :   72 in(Converted to: 6 ft 0 in, 182.88 cm)    Body Mass Index :   27.72 kg/m2 (HI)    Body Surface Area :   2.17 m2   Systolic Blood Pressure :   182 mmHg (HI)    Diastolic Blood Pressure :   115 mmHg (HI)    Mean Arterial Pressure :   137 mmHg   Peripheral Pulse Rate :   74 bpm   BP Site :   Right arm   BP Method :   Electronic   HR Method :   Electronic   Race :      Languages :   English   Ethnicity :   Not  or    Lauren Sy CMA - 3/12/2021 8:43 AM CST   Health Status   Tobacco Use? :   Never smoker   Lauren Sy CMA - 3/12/2021 8:43 AM CST   Consents   Consent for Immunization Exchange :   Consent Granted   Consent for Immunizations to Providers :   Consent Granted   Lauren Sy CMA - 3/12/2021 8:43 AM CST   Meds / Allergies   (As Of: 3/12/2021 8:49:24 AM CST)   Allergies (Active)   amoxicillin  Estimated Onset Date:   Unspecified ; Reactions:   rash ; Created By:   Sarah Alston LPN; Reaction Status:   Active ; Substance:   amoxicillin ; Updated By:   Sarah Alston LPN; Reviewed Date:   3/12/2021 8:47 AM CST      clindamycin  Estimated Onset Date:   Unspecified ; Reactions:   rash ; Created By:   Sarah Alston LPN; Reaction Status:   Active ; Category:   Drug ; Substance:   clindamycin ; Type:   Allergy ; Updated By:   Sarah Alston LPN; Reviewed Date:   3/12/2021 8:47 AM CST      percocet  Estimated Onset Date:   Unspecified ; Reactions:   nausea ; Created By:   Sarah Alston LPN; Reaction Status:   Active ; Substance:   percocet ; Updated By:   Sarah Alston LPN; Reviewed Date:   3/12/2021 8:47 AM CST        Medication List   (As Of: 3/12/2021 8:49:24 AM CST)   Prescription/Discharge  Order    metFORMIN  :   metFORMIN ; Status:   Prescribed ; Ordered As Mnemonic:   metFORMIN 500 mg oral tablet, extended release ; Simple Display Line:   1,000 mg, 2 tab(s), Oral, bid, 120 tab(s), 5 Refill(s) ; Ordering Provider:   Santosh Leiva MD; Catalog Code:   metFORMIN ; Order Dt/Tm:   5/5/2020 4:35:09 PM CDT          hydroCHLOROthiazide  :   hydroCHLOROthiazide ; Status:   Prescribed ; Ordered As Mnemonic:   hydroCHLOROthiazide 25 mg oral tablet ; Simple Display Line:   25 mg, 1 tab(s), Oral, daily, 90 tab(s), 1 Refill(s) ; Ordering Provider:   Santosh Leiva MD; Catalog Code:   hydroCHLOROthiazide ; Order Dt/Tm:   7/6/2020 3:39:31 PM CDT          atorvastatin  :   atorvastatin ; Status:   Prescribed ; Ordered As Mnemonic:   atorvastatin 20 mg oral tablet ; Simple Display Line:   20 mg, 1 tab(s), PO, Daily, 90 tab(s), 1 Refill(s) ; Ordering Provider:   Santosh Leiva MD; Catalog Code:   atorvastatin ; Order Dt/Tm:   5/5/2020 4:35:16 PM CDT          metoprolol  :   metoprolol ; Status:   Prescribed ; Ordered As Mnemonic:   Metoprolol Succinate  mg oral tablet, extended release ; Simple Display Line:   100 mg, 1 tab(s), Oral, daily, 90 tab(s), 1 Refill(s) ; Ordering Provider:   Santosh Leiva MD; Catalog Code:   metoprolol ; Order Dt/Tm:   7/6/2020 3:39:57 PM CDT            ID Risk Screen   Recent Travel History :   No recent travel   Family Member Travel History :   No recent travel   Other Exposure to Infectious Disease :   Unknown   COVID-19 Testing Status :   No COVID-19 test performed   Lauren Sy CMA - 3/12/2021 8:43 AM CST   Social History   Social History   (As Of: 3/12/2021 8:49:24 AM CST)   Alcohol:  Current      Current, 3-5 times per week   (Last Updated: 10/19/2017 4:47:52 PM CDT by Marta Artis CMA)          Tobacco:  Current      Current, Snuff   (Last Updated: 4/9/2010 2:14:59 PM CDT by Mirella Siddiqi CMA)    Comments:  11/27/2012 3:16 PM - Tania Mathis: daily   (Last Updated:  11/27/2012 3:16:07 PM CST by Tania Mathis)   Never (less than 100 in lifetime)   (Last Updated: 3/12/2021 8:47:40 AM CST by Lauren Sy CMA)          Electronic Cigarette/Vaping:        Electronic Cigarette Use: Never.   (Last Updated: 3/12/2021 8:47:44 AM CST by Lauren Sy CMA)          Substance Abuse:  Denies Substance Abuse      (Last Updated: 11/27/2012 3:16:13 PM CST by Tania Mathis )         Exercise:  Regular exercise      (Last Updated: 4/9/2010 2:15:45 PM CDT by Mirella Siddiqi CMA )

## 2022-02-15 NOTE — PROGRESS NOTES
Patient:   JESE CALLEJAS            MRN: [id number]            FIN: [id number]               Age:   56 years     Sex:  Male     :  1965   Associated Diagnoses:   LUPE (generalized anxiety disorder)   Author:   To Blackburn PA-C      Report Summary   Diagnosis  LUPE (generalized anxiety disorder) (LAU28-WW F41.1).  Patient InstructionsSummaryOrders   Visit Information      Date of Service: 10/14/2021 09:20 am  Performing Location: [facility]  Encounter#: [id number]      Primary Care Provider (PCP):  To Blackburn PA-C    NPI# [id number]   Visit type:  General concerns.    Accompanied by:  No one.    Source of history:  Self, Medical record.    Referral source:  Self.    History limitation:  None.       Chief Complaint   10/14/2021 9:31 AM CDT   Discuss Anxiety        History of Present Illness             The patient presents with anxiety.  The anxiety is characterized by difficulty concentrating, nervousness and sense of impending doom.  The severity of the anxiety is moderate.  The anxiety is episodic, fluctuates in intensity and is worsening.  The anxiety has lasted for 2 month(s).  No big social change. Feeling more anxious past few months, much more acute past two weeks or so. Not suicidal. History of depression. 6-7 beers a day. CC above noted and confirmed with the patient..        Review of Systems   Constitutional:  Negative.    Respiratory:  Negative.    Cardiovascular:  Negative.    Psychiatric:  Negative except as documented in history of present illness.       Health Status   Allergies:    Allergic Reactions (Selected)  Severity Not Documented  Amoxicillin (Rash)  Clindamycin (Rash)  Percocet (Nausea)   Medications:  (Selected)   Prescriptions  Prescribed  Ativan 1 mg oral tablet: = 1 tab(s) ( 1 mg ), Oral, tid, PRN: for anxiety, # 9 tab(s), 0 Refill(s), Type: Acute, Pharmacy: [facility], 1 tab(s) Oral tid,PRN:for anxiety, 72, in, 21 8:43:00 CST, Height Measured,  192.9, lb, 10/14/21 9:31:00 CDT, Weight Measured  Lexapro 10 mg oral tablet: = 1 tab(s) ( 10 mg ), Oral, daily, # 30 tab(s), 0 Refill(s), Type: Maintenance, Pharmacy: [facility], 1 tab(s) Oral daily, 72, in, 03/12/21 8:43:00 CST, Height Measured, 192.9, lb, 10/14/21 9:31:00 CDT, Weight Measured  hydrochlorothiazide-lisinopril 25 mg-20 mg oral tablet: 1 tab(s), Oral, daily, # 90 tab(s), 3 Refill(s), Type: Maintenance, Pharmacy: [facility], 1 tab(s) Oral daily, 72, in, 03/12/21 8:43:00 CST, Height Measured, 204.4, lb, 03/12/21 8:43:00 CST, Weight Measured  metFORMIN 500 mg oral tablet, extended release: = 1 tab(s) ( 500 mg ), Oral, bid, # 180 tab(s), 3 Refill(s), Type: Maintenance, Pharmacy: [facility], I did add lisinopril to his HCTZ, 1 tab(s) Oral bid, 72, in, 03/12/21 8:43:00 CST, Height Measured, 204.4, lb, 03/12/21 8:43:00 CST, Weight M...   Problem list:    All Problems (Selected)  Type 2 diabetes mellitus / SNOMED CT 458584607 / Confirmed  Tobacco abuse / ICD-9-.1 / Confirmed  Obesity / SNOMED CT 9014956840 / Probable  Insomnia / ICD-9-.52 / Confirmed  Hypertension / ICD-9-.9 / Confirmed  Depression / SNOMED CT 875554096 / Confirmed  Allergic Rhinitis / ICD-9-.9 / Confirmed      Histories   Past Medical History:    Active  Allergic Rhinitis (477.9)  Tobacco abuse (305.1)   Family History:    Hypertension  Mother     Procedure history:    Repair of inguinal hernia (80643922) on 2/26/2019 at 53 Years.  Comments:  3/6/2019 12:42 PM CST - Dora Turpin  left  Discectomy (7120298) in the month of 11/2003 at 38 Years.   Social History:        Electronic Cigarette/Vaping Assessment            Electronic Cigarette Use: Never.      Alcohol Assessment: Current            Current, 3-5 times per week      Tobacco Assessment: Current            Never (less than 100 in lifetime)            Current, Snuff                     Comments:                      11/27/2012 - Delfina  Tania                     daily      Substance Abuse Assessment: Denies Substance Abuse      Exercise and Physical Activity Assessment: Regular exercise        Physical Examination   Vital Signs   10/14/2021 9:31 AM CDT Peripheral Pulse Rate 61 bpm    HR Method Electronic    Systolic Blood Pressure 180 mmHg  HI    Diastolic Blood Pressure 119 mmHg  HI    Mean Arterial Pressure 139 mmHg    BP Site Right arm    BP Method Electronic      Measurements from flowsheet : Measurements   10/14/2021 9:31 AM CDT   Weight Measured - Standard                192.9 lb     Respiratory:  Lungs are clear to auscultation, Respirations are non-labored.    Cardiovascular:  Normal rate, Regular rhythm, No murmur.    Psychiatric:  Cooperative, No Abnormal / Psychotic thoughts.         Mood and affect: Flat.         Behavior: Relaxed.         Judgment: Able to make sensible decisions.         Thought process: Appropriate.       Impression and Plan   Diagnosis     LUPE (generalized anxiety disorder) (GDO28-CX F41.1).     Patient Instructions:       Counseled: Patient, Regarding diagnosis, Regarding treatment, Regarding medications, Activity, Verbalized understanding.    Summary:  Ativan short term use until Lexapro reaches steady state, RTC in 3 weeks and PRN.    Orders     Orders (Selected)   Prescriptions  Prescribed  Ativan 1 mg oral tablet: = 1 tab(s) ( 1 mg ), Oral, tid, PRN: for anxiety, # 9 tab(s), 0 Refill(s), Type: Acute, Pharmacy: [facility], 1 tab(s) Oral tid,PRN:for anxiety, 72, in, 03/12/21 8:43:00 CST, Height Measured, 192.9, lb, 10/14/21 9:31:00 CDT, Weight Measured  Lexapro 10 mg oral tablet: = 1 tab(s) ( 10 mg ), Oral, daily, # 30 tab(s), 0 Refill(s), Type: Maintenance, Pharmacy: [facility], 1 tab(s) Oral daily, 72, in, 03/12/21 8:43:00 CST, Height Measured, 192.9, lb, 10/14/21 9:31:00 CDT, Weight Measured.

## 2022-02-15 NOTE — NURSING NOTE
Foot Exam Entered On:  3/12/2021 8:52 AM CST    Performed On:  3/12/2021 8:52 AM CST by Lauren Sy CMA               Diabetes Foot Exam   Foot Exam + Monofilimant TR :   3/12/2021 CST   Foot Exam Risk Assessment TR :   High risk   Exam Date :   Normal foot exam   Lauren Sy CMA - 3/12/2021 8:52 AM CST

## 2022-02-15 NOTE — LETTER
(Inserted Image. Unable to display) January 28, 2019Re: JESE CALLEJAS1965Abraham Culver M.D.[address] [place], [place] [address]Xs: Dr. CulverThe following patient has been referred to your office/practice:  JESE CASTROOLEKSANDRppointment: February 4, 2019 at 12:40 P.M.Location: [facility].Please refer to the attached clinical documentation for a summary of JESE's care.  Please do not hesitate to contact our office if any additional questions arise.  All relevant documents and transition of care documents should be mailed or faxed.Your assistance in providing continuity of care is appreciated.Sincerely, [facility] & [address] [address](P) [phone](F) [phone]

## 2022-02-15 NOTE — LETTER
(Inserted Image. Unable to display)   [address] [facility] [address]  January 10, 2019      JESE CALLEJAS  [address] [place] [phone]        Dear JESE,     Thank you for selecting [facility] for your healthcare needs. Below you will find the results of the recent tests done at our clinic.     Your blood GLUCOSE is extremely high indicating you have adult onset DIABETES.  Please make a return appointment to discuss this and start your treatment plan.      Result Name Current Result Reference Range   Sodium Level (mmol/L)  135 1/9/2019 135 - 146   Potassium Level (mmol/L)  3.8 1/9/2019 3.5 - 5.3   Chloride Level (mmol/L) ((L)) 96 1/9/2019 98 - 110   CO2 Level (mmol/L)  29 1/9/2019 20 - 32   Glucose Level (mg/dL) ((H)) 328 1/9/2019 65 - 99   BUN (mg/dL)  13 1/9/2019 7 - 25   Creatinine Level (mg/dL)  0.96 1/9/2019 0.70 - 1.33   BUN/Creat Ratio  NOT APPLICABLE 1/9/2019 6 - 22   eGFR (mL/min/1.73m2)  91 1/9/2019 > OR = 60 -    eGFR  (mL/min/1.73m2)  105 1/9/2019 > OR = 60 -    Calcium Level (mg/dL)  9.9 1/9/2019 8.6 - 10.3       Please contact my practice at [phone]  if you have any questions or concerns.     Sincerely,        Santosh Leiva MD          What do your labs mean?  Below is a glossary of commonly ordered labs:  LDL   Bad Cholesterol   HDL   Good Cholesterol  AST/ALT   Liver Function   Cr/Creatinine   Kidney Function  Microalbumin   Kidney Function  BUN   Kidney Function  PSA   Prostate    TSH   Thyroid Hormone  HgbA1c   Diabetes Test   Hgb (Hemoglobin)   Red Blood Cells

## 2022-02-15 NOTE — TELEPHONE ENCOUNTER
Entered by Marta Artis CMA on July 06, 2020 3:40:19 PM CDT  ---------------------  From: Marta Artis CMA   To: [facility]    Sent: 7/6/2020 3:40:19 PM CDT  Subject: Medication Management     ** Submitted: **  Order:metoprolol (Metoprolol Succinate  mg oral tablet, extended release)  1 tab(s)  Oral  daily  Qty:  90 tab(s)        Refills:  1          Substitutions Allowed     Route To [facility]    Signed by Marta Artis CMA  7/6/2020 8:39:00 PM [facility]    ** Not Approved:  **  metoprolol (METOPROL SUC 100MG ER)  TAKE ONE (1) TABLET DAILY  Qty:  90 tab(s)        Days Supply:  90        Refills:  0          Substitutions Allowed     Route To [facility]   Signed by Marta Artis CMA            ** Submitted: **  Order:hydroCHLOROthiazide (hydroCHLOROthiazide 25 mg oral tablet)  1 tab(s)  Oral  daily  Qty:  90 tab(s)        Refills:  1          Substitutions Allowed     Route To [facility]    Signed by Marta Artis CMA  7/6/2020 8:39:00 PM [facility]    ** Not Approved:  **  hydroCHLOROthiazide (HYDROCHLOROT 25MG)  TAKE ONE (1) TABLET EACH MORNING  Qty:  90 tab(s)        Days Supply:  90        Refills:  0          Substitutions Allowed     Route To [facility]   Signed by Marta Artis CMA            ** Patient matched by Marta Artis CMA on 7/6/2020 3:39:16 PM CDT **      ------------------------------------------  From: [facility]  To: Snatosh Leiva MD  Sent: July 6, 2020 3:28:52 PM CDT  Subject: Medication Management  Due: June 24, 2020 10:12:23 PM CDT     ** On Hold Pending Signature **     Drug: metoprolol (Metoprolol Succinate  mg oral tablet, extended release), TAKE ONE (1) TABLET DAILY  Quantity: 90 tab(s)  Days Supply: 90  Refills: 0  Substitutions Allowed  Notes from Pharmacy:     Dispensed Drug: metoprolol (Metoprolol Succinate  mg oral tablet, extended release), TAKE ONE (1) TABLET  DAILY  Quantity: 90 tab(s)  Days Supply: 90  Refills: 0  Substitutions Allowed  Notes from Pharmacy:     ** On Hold Pending Signature **     Drug: hydroCHLOROthiazide (hydroCHLOROthiazide 25 mg oral tablet), TAKE ONE (1) TABLET EACH MORNING  Quantity: 90 tab(s)  Days Supply: 90  Refills: 0  Substitutions Allowed  Notes from Pharmacy:     Dispensed Drug: hydroCHLOROthiazide (hydroCHLOROthiazide 25 mg oral tablet), TAKE ONE (1) TABLET EACH MORNING  Quantity: 90 tab(s)  Days Supply: 90  Refills: 0  Substitutions Allowed  Notes from Pharmacy:  ------------------------------------------

## 2022-02-15 NOTE — NURSING NOTE
Depression Screening Entered On:  10/14/2021 9:50 AM CDT    Performed On:  10/14/2021 9:49 AM CDT by Lauren Sy CMA               Depression Screening   Little Interest - Pleasure in Activities :   Several days   Feeling Down, Depressed, Hopeless :   Not at all   Initial Depression Screen Score :   1 Score   Poor Appetite or Overeating :   Not at all   Trouble Falling or Staying Asleep :   Several days   Feeling Tired or Little Energy :   Several days   Feeling Bad About Yourself :   Not at all   Trouble Concentrating :   Not at all   Moving or Speaking Slowly :   Not at all   Thoughts Better Off Dead or Hurting Self :   Not at all   Difficulty at Work, Home, Getting Along :   Somewhat difficult   Detailed Depression Screen Score :   2    Total Depression Screen Score :   3    Lauren Sy CMA - 10/14/2021 9:49 AM CDT

## 2022-02-15 NOTE — NURSING NOTE
Depression Screening Entered On:  3/12/2021 9:05 AM CST    Performed On:  3/12/2021 9:04 AM CST by Lauren Sy CMA               Depression Screening   Little Interest - Pleasure in Activities :   Not at all   Feeling Down, Depressed, Hopeless :   Not at all   Initial Depression Screen Score :   0 Score   Poor Appetite or Overeating :   Not at all   Trouble Falling or Staying Asleep :   Not at all   Feeling Tired or Little Energy :   Not at all   Feeling Bad About Yourself :   Not at all   Trouble Concentrating :   Not at all   Moving or Speaking Slowly :   Not at all   Thoughts Better Off Dead or Hurting Self :   Not at all   Detailed Depression Screen Score :   0    Total Depression Screen Score :   0    Lauren Sy CMA - 3/12/2021 9:04 AM CST

## 2022-02-15 NOTE — NURSING NOTE
Comprehensive Intake Entered On:  9/19/2019 9:00 AM CDT    Performed On:  9/19/2019 8:56 AM CDT by Marta Artis CMA               Summary   Chief Complaint :   Pt here for med ck   Weight Measured :   199 lb(Converted to: 199 lb 0 oz, 90.26 kg)    Height Measured :   72 in(Converted to: 6 ft 0 in, 182.88 cm)    Body Mass Index :   26.99 kg/m2 (HI)    Body Surface Area :   2.14 m2   Systolic Blood Pressure :   136 mmHg (HI)    Diastolic Blood Pressure :   88 mmHg (HI)    Mean Arterial Pressure :   104 mmHg   Peripheral Pulse Rate :   71 bpm   Oxygen Saturation :   97 %   Race :      Languages :   English   Ethnicity :   Not  or    Marta Artis CMA - 9/19/2019 8:56 AM CDT   Health Status   Allergies Verified? :   Yes   Medication History Verified? :   Yes   Medical History Verified? :   Yes   Pre-Visit Planning Status :   Completed   Tobacco Use? :   Current every day smoker   Marta Artis CMA - 9/19/2019 8:56 AM CDT   Consents   Consent for Immunization Exchange :   Consent Granted   Consent for Immunizations to Providers :   Consent Granted   Marta Artis CMA - 9/19/2019 8:56 AM CDT   Meds / Allergies   (As Of: 9/19/2019 9:00:35 AM CDT)   Allergies (Active)   amoxicillin  Estimated Onset Date:   Unspecified ; Reactions:   rash ; Created By:   Sarah Alston LPN; Reaction Status:   Active ; Substance:   amoxicillin ; Updated By:   Sarah Alston LPN; Reviewed Date:   9/19/2019 8:59 AM CDT      clindamycin  Estimated Onset Date:   Unspecified ; Reactions:   rash ; Created By:   Sarah Alston LPN; Reaction Status:   Active ; Category:   Drug ; Substance:   clindamycin ; Type:   Allergy ; Updated By:   Sarah Alston LPN; Reviewed Date:   9/19/2019 8:59 AM CDT      percocet  Estimated Onset Date:   Unspecified ; Reactions:   nausea ; Created By:   Sarah Alston LPN; Reaction Status:   Active ; Substance:   percocet ; Updated By:   Sarah Alston LPN; Reviewed Date:    9/19/2019 8:59 AM CDT        Medication List   (As Of: 9/19/2019 9:00:35 AM CDT)   Prescription/Discharge Order    atorvastatin  :   atorvastatin ; Status:   Prescribed ; Ordered As Mnemonic:   atorvastatin 20 mg oral tablet ; Simple Display Line:   20 mg, 1 tab(s), PO, Daily, 30 tab(s), 5 Refill(s) ; Ordering Provider:   Santosh Leiva MD; Catalog Code:   atorvastatin ; Order Dt/Tm:   1/23/2019 9:45:15 AM          cephalexin  :   cephalexin ; Status:   Processing ; Ordered As Mnemonic:   cephalexin 500 mg oral capsule ; Ordering Provider:   Santosh Leiva MD; Action Display:   Complete ; Catalog Code:   cephalexin ; Order Dt/Tm:   9/19/2019 8:59:04 AM          flunisolide nasal  :   flunisolide nasal ; Status:   Processing ; Ordered As Mnemonic:   flunisolide 25 mcg/inh nasal spray ; Ordering Provider:   Santosh Leiva MD; Action Display:   Complete ; Catalog Code:   flunisolide nasal ; Order Dt/Tm:   9/19/2019 8:59:08 AM          fluticasone nasal  :   fluticasone nasal ; Status:   Prescribed ; Ordered As Mnemonic:   Flonase 50 mcg/inh nasal spray ; Simple Display Line:   2 spray(s), nasal, daily, 3 EA, 3 Refill(s) ; Ordering Provider:   Santosh Leiva MD; Catalog Code:   fluticasone nasal ; Order Dt/Tm:   1/9/2019 9:10:51 AM          hydrochlorothiazide-metoprolol  :   hydrochlorothiazide-metoprolol ; Status:   Prescribed ; Ordered As Mnemonic:   hydrochlorothiazide-metoprolol 25 mg-100 mg oral tablet ; Simple Display Line:   1 tab(s), po, daily, 90 tab(s), 3 Refill(s) ; Ordering Provider:   Santosh Leiva MD; Catalog Code:   hydrochlorothiazide-metoprolol ; Order Dt/Tm:   1/9/2019 9:10:30 AM          metFORMIN  :   metFORMIN ; Status:   Prescribed ; Ordered As Mnemonic:   metFORMIN 500 mg oral tablet ; Simple Display Line:   1,000 mg, 2 tab(s), PO, BID, 120 tab(s), 5 Refill(s) ; Ordering Provider:   Santosh Leiva MD; Catalog Code:   metFORMIN ; Order Dt/Tm:   1/23/2019 9:44:49 AM

## 2022-02-15 NOTE — PROGRESS NOTES
Patient:   JESE CALLEJAS            MRN: [id number]            FIN: [id number]               Age:   51 years     Sex:  Male     :  1965   Associated Diagnoses:   Depression; Hypertension   Author:   Santosh Leiva MD      Impression and Plan   Diagnosis     Depression ([id number] F33.1).     Course:  Progressing as expected.    Orders     Orders   Charges (Evaluation and Management):  17032 office outpatient visit 25 minutes (Charge) (Order): Quantity: 1, Hypertension  Allergic Rhinitis  Depression.     Orders (Selected)   Prescriptions  Prescribed  sertraline 50 mg oral tablet: 1 tab(s) ( 50 mg ), PO, Daily, # 30 tab(s), 5 Refill(s), Type: Maintenance, Pharmacy: [facility], 1 tab(s) po daily.     Diagnosis     Depression (NQS65-CK F33.1).     Course:  Well controlled.    Orders     Orders (Selected)   Prescriptions  Prescribed  sertraline 50 mg oral tablet: 1 tab(s) ( 50 mg ), PO, Daily, # 30 tab(s), 5 Refill(s), Type: Maintenance, Pharmacy: [facility], 1 tab(s) po daily.     Diagnosis     Hypertension (VMJ99-DG I10).     Course:  Well controlled.    Orders     Orders (Selected)   Prescriptions  Prescribed  hydroCHLOROthiazide-metoprolol 12.5 mg-100 mg oral tablet, extended release: 1 tab(s), po, daily, # 30 tab(s), 5 Refill(s), Type: Maintenance, Pharmacy: [facility], 1 tab(s) po daily.        Visit Information      Date of Service: 2017 03:06 pm  Performing Location: [facility]  Encounter#: [id number]      Primary Care Provider (PCP):  Santosh Leiva MD    NPI# [id number]   Visit type:  Scheduled follow-up.    Accompanied by:  No one.    Source of history:  Self.    Referral source:  Self.    History limitation:  None.       Chief Complaint   2017 3:09 PM CST     Pt here for med ck and rash on arm        History of Present Illness             The patient presents for follow-up evaluation of hypertension.  The quality of hypertension symptom(s) since the  patient's last visit is described as being unchanged.  The severity of the hypertension symptom(s) since the last visit is moderate.  Since the patient's last visit, the timing/course of hypertension symptom(s) is constant.  Exacerbating factors consist of none.  Relieving factors consist of medication.  Associated symptoms consist of none.  Prior treatment consists of lifestyle modification (weight reduction, dietary sodium restriction, increased physical activity, adoption of DASH eating plan).  Medical encounters: none.  Compliance problems: none.               The patient presents with rhinorrhea.  The rhinorrhea is from both nostrils.  The rhinorrhea is described as clear.  The severity of the rhinorrhea is severe.  The rhinorrhea is constant and is worsening.  The context of the rhinorrhea: occurred after exposure to allergens.  Exacerbating factors consist of pollen.  Relieving factors consist of allergen avoidance and medication.  Associated symptoms consist of itchy eyes, nasal congestion, sneezing, denies fever, denies headache, denies cough, denies ear pain, denies facial pain and denies sore throat.        Interval History   Depression   Side effects of medication unchanged.  The course is progressing as expected.  Compliance problems: none.  The effect on daily activities is no change in activity level, no change in eating habits and no change in sleeping patterns.  Medical encounters: none.  Associated symptoms characterized by no insomnia, weight gain, weight loss, loss of appetite or suicidal thoughts.        Review of Systems   Constitutional:  Fatigue.    Eye:  Negative.    Ear/Nose/Mouth/Throat:  Negative.    Respiratory:  Negative.    Cardiovascular:  Negative.    Gastrointestinal:  Negative.    Genitourinary:  Negative.    Hematology/Lymphatics:  Negative.    Endocrine:  Negative.    Immunologic:  Negative.    Musculoskeletal:  Negative.    Integumentary:  Negative, dermatitis right antecubital  fossa.    Neurologic:  Negative.    Psychiatric:  Negative.    All other systems reviewed and negative      Health Status   Allergies:    Allergic Reactions (Selected)  Severity Not Documented  Amoxicillin (Rash)  Clindamycin (Rash)  Percocet (Nausea)   Medications:  (Selected)   Prescriptions  Prescribed  betamethasone valerate 0.1% topical cream: 1 wilfredo, TOP, tid, # 45 g, 1 Refill(s), Type: Maintenance, Pharmacy: [facility], 1 wilfredo top tid  flunisolide 25 mcg/inh nasal spray: 2 puff(s), nasal, daily, # 1 EA, 5 Refill(s), Type: Maintenance, Pharmacy: [facility], 2 puff(s) nasal daily  hydroCHLOROthiazide-metoprolol 12.5 mg-100 mg oral tablet, extended release: 1 tab(s), po, daily, # 30 tab(s), 5 Refill(s), Type: Maintenance, Pharmacy: [facility], 1 tab(s) po daily  sertraline 50 mg oral tablet: 1 tab(s) ( 50 mg ), PO, Daily, # 30 tab(s), 5 Refill(s), Type: Maintenance, Pharmacy: [facility], 1 tab(s) po daily   Problem list:    All Problems  Allergic Rhinitis / ICD-9-.9 / Confirmed  Depression / ICD-9- / Confirmed  Hypertension / ICD-9-.9 / Confirmed  Insomnia / ICD-9-.52 / Confirmed  Obesity / SNOMED CT 5371211044 / Probable  Tobacco abuse / ICD-9-.1 / Confirmed  Inactive: Increased Blood Pressure (not Hypertension) / ICD-9-.2      Histories   Past Medical History:    Active  Allergic Rhinitis (477.9)  Tobacco abuse (305.1)   Family History:    Hypertension  Mother     Procedure history:    Discectomy (SNOMED CT 4017336) in the month of 11/2003 at 38 Years.   Social History:        Alcohol Assessment: Current            Current, 1-2 times per week      Tobacco Assessment: Current            Current, Snuff                     Comments:                      11/27/2012 - Tania Mathis                     daily      Substance Abuse Assessment: Denies Substance Abuse      Exercise and Physical Activity Assessment: Regular exercise        Physical  Examination   Vital Signs   2/9/2017 3:09 PM CST Peripheral Pulse Rate 88 bpm    Pulse Site Radial artery    HR Method Manual    Systolic Blood Pressure 148 mmHg  HI    Diastolic Blood Pressure 86 mmHg    Mean Arterial Pressure 107 mmHg    BP Site Right arm    BP Method Manual      Measurements from flowsheet : Measurements   2/9/2017 3:09 PM CST Height Measured - Standard 72 in    Weight Measured - Standard 231.6 lb    BSA 2.31 m2    Body Mass Index 31.41 kg/m2      General:  No acute distress.    Neck:  Supple, No lymphadenopathy, No thyromegaly.    Respiratory:  Lungs are clear to auscultation, Respirations are non-labored, Breath sounds are equal, Symmetrical chest wall expansion.    Cardiovascular:  Normal rate, Regular rhythm, No murmur, No gallop, Good pulses equal in all extremities, Normal peripheral perfusion, No edema.    Gastrointestinal:  Soft, Non-tender, Non-distended, Normal bowel sounds, No organomegaly.    Integumentary:  Warm, Dry, Pink.    Neurologic:  Alert, Oriented.    Psychiatric:  Normal recall. Normal fund of Knowledge. Normal Grooming. No evidence of Psychosis. Normal Insight and Judgment. Appropriate Mood and Affect. Nonsuicidal. Denies Chemical Abuse. Cooperative and Pleasant. No Anxiety. Normal Speech and Language. , PHQ 9 score: 3.

## 2022-02-15 NOTE — NURSING NOTE
Generalized Anxiety Disorder Screening Entered On:  10/14/2021 9:50 AM CDT    Performed On:  10/14/2021 9:49 AM CDT by Lauren Sy CMA               LUPE-7   LUPE Nervous, Anxious On Edge :   Nearly every day   LUPE Control Worrying B :   Nearly every day   LUPE Worrying Too Much :   Nearly every day   LUPE Trouble Relaxing :   More than half the days   LUPE Restless :   More than half the days   LUPE Easily Annoyed/Irritable :   More than half the days   LUPE Afraid :   Nearly every day   LUPE Total Screening Score :   18    LUPE Difficulty with Work, Home, Others :   Very difficult   Lauren Sy CMA - 10/14/2021 9:49 AM CDT

## 2022-02-15 NOTE — PROGRESS NOTES
Patient:   JESE CALLEJAS            MRN: [id number]            FIN: [id number]               Age:   51 years     Sex:  Male     :  1965   Associated Diagnoses:   Left acute suppurative otitis media   Author:   To Blackburn PA-C      Visit Information      Date of Service: 2017 02:52 pm  Performing Location: [facility]  Encounter#: [id number]   Visit type:  General concerns.    Accompanied by:  No one.    Source of history:  Self, Medical record.    Referral source:  Self.    History limitation:  None.       Chief Complaint   3/20/2017 2:55 PM CDT    Pt here for plugged ears and ringing        History of Present Illness             The patient presents with earache.  The location of the earache is the left ear.  The earache is characterized by sensation of fullness.  The severity of the earache is moderate.  The earache is episodic, fluctuates in intensity and is worsening.  The context of the earache: occurred in association with illness.  URI x one week. Left ear plugged and muffled. CC above noted and confirmed with the patient.Uses nasal steroid year round..  Associated symptoms consist of cough, nasal congestion, denies dizziness and denies vertigo.        Review of Systems   Constitutional:  Negative.    Eye:  Negative.    Ear/Nose/Mouth/Throat:  Negative except as documented in history of present illness.    Respiratory:  Negative except as documented in history of present illness.       Health Status   Allergies:    Allergic Reactions (All)  Severity Not Documented  Amoxicillin (Rash)  Clindamycin (Rash)  Percocet (Nausea)   Medications:  (Selected)   Prescriptions  Prescribed  Zithromax 250 mg oral tablet: 1 packet(s), PO, Once, Instructions: as directed on package labeling. Two tablets po on day one, then one tablet daily x four days, # 6 tab(s), 0 Refill(s), Type: Soft Stop, Pharmacy: [facility], 1 packet(s) po once,Instr:as directed on packag...  betamethasone  valerate 0.1% topical cream: 1 wilfredo, TOP, tid, # 45 g, 1 Refill(s), Type: Maintenance, Pharmacy: [facility], 1 wilfredo top tid  flunisolide 25 mcg/inh nasal spray: 2 puff(s), nasal, daily, # 1 EA, 5 Refill(s), Type: Maintenance, Pharmacy: [facility], 2 puff(s) nasal daily  hydroCHLOROthiazide-metoprolol 12.5 mg-100 mg oral tablet, extended release: 1 tab(s), po, daily, # 30 tab(s), 5 Refill(s), Type: Maintenance, Pharmacy: [facility], 1 tab(s) po daily  predniSONE 20 mg oral tablet: 1 tab(s) ( 20 mg ), PO, Daily, x 7 day(s), # 7 tab(s), 0 Refill(s), Type: Acute, Pharmacy: [facility], 1 tab(s) po daily,x7 day(s)  sertraline 50 mg oral tablet: 1 tab(s) ( 50 mg ), PO, Daily, # 30 tab(s), 5 Refill(s), Type: Maintenance, Pharmacy: [facility], 1 tab(s) po daily   Problem list:    All Problems (Selected)  Allergic Rhinitis / ICD-9-.9 / Confirmed  Hypertension / ICD-9-.9 / Confirmed  Insomnia / ICD-9-.52 / Confirmed  Obesity / SNOMED CT 7467295886 / Probable  Depression / SNOMED CT 291226973 / Confirmed  Tobacco abuse / ICD-9-.1 / Confirmed      Histories   Past Medical History:    Active  Allergic Rhinitis (477.9)  Tobacco abuse (305.1)   Family History:    Hypertension  Mother     Procedure history:    Discectomy (7285944) in the month of 11/2003 at 38 Years.   Social History:        Alcohol Assessment: Current            Current, 1-2 times per week      Tobacco Assessment: Current            Current, Snuff                     Comments:                      11/27/2012 - Tania Mathis                     daily      Substance Abuse Assessment: Denies Substance Abuse      Exercise and Physical Activity Assessment: Regular exercise        Physical Examination   Vital Signs   3/20/2017 2:55 PM CDT Peripheral Pulse Rate 88 bpm    Pulse Site Radial artery    Systolic Blood Pressure 130 mmHg    Diastolic Blood Pressure 82 mmHg    Mean Arterial Pressure 98 mmHg    BP Site  Right arm      Measurements from flowsheet : Measurements   3/20/2017 2:55 PM CDT    Weight Measured - Standard                230 lb     General:  Alert and oriented, No acute distress.    Eye:  Pupils are equal, round and reactive to light, Extraocular movements are intact, Normal conjunctiva.    HENT:  Normocephalic, Oral mucosa is moist, No pharyngeal erythema.         Ear: Tympanic membrane ( Intact, Erythematous, Fluid in middle ear ).         Nose: Both nostrils, Discharge ( Small amount, Clear ).    Neck:  Supple, Non-tender, No lymphadenopathy.    Respiratory:  Lungs are clear to auscultation, Respirations are non-labored, Breath sounds are equal.    Cardiovascular:  Normal rate, Regular rhythm.       Impression and Plan   Diagnosis     Left acute suppurative otitis media (AWK14-JA H66.002).     Patient Instructions:       Counseled: Patient, Regarding diagnosis, Regarding treatment, Regarding medications, Activity, Verbalized understanding.    Orders     Orders (Selected)   Prescriptions  Prescribed  Zithromax 250 mg oral tablet: 1 packet(s), PO, Once, Instructions: as directed on package labeling. Two tablets po on day one, then one tablet daily x four days, # 6 tab(s), 0 Refill(s), Type: Soft Stop, Pharmacy: [facility], 1 packet(s) po once,Instr:as directed on packag...  predniSONE 20 mg oral tablet: 1 tab(s) ( 20 mg ), PO, Daily, x 7 day(s), # 7 tab(s), 0 Refill(s), Type: Acute, Pharmacy: [facility], 1 tab(s) po daily,x7 day(s).     Take medicine as prescribed, side effects discussed.  Tylenol/ibuprofen for fever and discomfort.  Push fluids.  RTC if not improving in 36-48 hours, prior if concerns as we have discussed.

## 2022-02-15 NOTE — NURSING NOTE
Comprehensive Intake Entered On:  10/25/2021 4:33 PM CDT    Performed On:  10/25/2021 4:31 PM CDT by Lauren Sy CMA               Summary   Chief Complaint :   F/u ER, medication management   Race :      Languages :   English   Ethnicity :   Not  or    Lauren Sy CMA - 10/25/2021 4:31 PM CDT   Health Status   Allergies Verified? :   Yes   Medication History Verified? :   Yes   Medical History Verified? :   Yes   Tobacco Use? :   Never smoker   Lauren Sy CMA - 10/25/2021 4:31 PM CDT   Consents   Consent for Immunization Exchange :   Consent Granted   Consent for Immunizations to Providers :   Consent Granted   Lauren Sy CMA - 10/25/2021 4:31 PM CDT   Meds / Allergies   (As Of: 10/25/2021 4:33:23 PM CDT)   Allergies (Active)   amoxicillin  Estimated Onset Date:   Unspecified ; Reactions:   rash ; Created By:   Sarah Alston LPN; Reaction Status:   Active ; Substance:   amoxicillin ; Updated By:   Sarah Alston LPN; Reviewed Date:   10/25/2021 4:31 PM CDT      clindamycin  Estimated Onset Date:   Unspecified ; Reactions:   rash ; Created By:   Sarah Alston LPN; Reaction Status:   Active ; Category:   Drug ; Substance:   clindamycin ; Type:   Allergy ; Updated By:   Sarah Alston LPN; Reviewed Date:   10/25/2021 4:31 PM CDT      percocet  Estimated Onset Date:   Unspecified ; Reactions:   nausea ; Created By:   Sarah Alston LPN; Reaction Status:   Active ; Substance:   percocet ; Updated By:   Sarah Alston LPN; Reviewed Date:   10/25/2021 4:31 PM CDT        Medication List   (As Of: 10/25/2021 4:33:23 PM CDT)   Prescription/Discharge Order    hydrochlorothiazide-lisinopril  :   hydrochlorothiazide-lisinopril ; Status:   Prescribed ; Ordered As Mnemonic:   hydrochlorothiazide-lisinopril 25 mg-20 mg oral tablet ; Simple Display Line:   1 tab(s), Oral, daily, 90 tab(s), 3 Refill(s) ; Ordering Provider:   To Blackburn PA-C; Catalog Code:    hydrochlorothiazide-lisinopril ; Order Dt/Tm:   3/12/2021 9:07:41 AM CST          metFORMIN  :   metFORMIN ; Status:   Prescribed ; Ordered As Mnemonic:   metFORMIN 500 mg oral tablet, extended release ; Simple Display Line:   500 mg, 1 tab(s), Oral, bid, 180 tab(s), 3 Refill(s) ; Ordering Provider:   To Blackburn PA-C; Catalog Code:   metFORMIN ; Order Dt/Tm:   3/12/2021 9:08:28 AM CST          escitalopram  :   escitalopram ; Status:   Processing ; Ordered As Mnemonic:   Lexapro 10 mg oral tablet ; Ordering Provider:   To Blackburn PA-C; Action Display:   Complete ; Catalog Code:   escitalopram ; Order Dt/Tm:   10/25/2021 4:32:49 PM CDT            Home Meds    hydrOXYzine  :   hydrOXYzine ; Status:   Documented ; Ordered As Mnemonic:   hydrOXYzine ; Simple Display Line:   0 Refill(s) ; Catalog Code:   hydrOXYzine ; Order Dt/Tm:   10/25/2021 4:32:39 PM CDT

## 2022-02-15 NOTE — NURSING NOTE
Comprehensive Intake Entered On:  11/8/2021 3:57 PM CST    Performed On:  11/8/2021 3:56 PM CST by Lauren Sy CMA   Chief Complaint :   f/u Anxiety, med refill; verbal consent given for telephone visit   Race :      Languages :   English   Ethnicity :   Not  or    Lauren Sy CMA - 11/8/2021 3:56 PM CST   Health Status   Allergies Verified? :   Yes   Medication History Verified? :   Yes   Medical History Verified? :   Yes   Tobacco Use? :   Never smoker   Lauren Sy CMA - 11/8/2021 3:56 PM CST   Consents   Consent for Immunization Exchange :   Consent Granted   Consent for Immunizations to Providers :   Consent Granted   Lauren Sy CMA - 11/8/2021 3:56 PM CST

## 2022-02-15 NOTE — PROGRESS NOTES
Patient:   JESE CALLEJAS            MRN: [id number]            FIN: [id number]               Age:   51 years     Sex:  Male     :  1965   Associated Diagnoses:   ETD (eustachian tube dysfunction)   Author:   To Blackburn PA-C      Visit Information      Date of Service: 2017 08:35 am  Performing Location: [facility]  Encounter#: [id number]   Visit type:  General concerns.    Accompanied by:  No one.    Source of history:  Self, Medical record.    Referral source:  Self.    History limitation:  None.       Chief Complaint   3/28/2017 8:38 AM CDT    Pt here for plugged ears        History of Present Illness             The patient presents with earache.  The location of the earache is the left ear.  The earache is characterized by sensation of fullness.  The severity of the earache is moderate.  The earache is episodic, fluctuates in intensity and is worsening.  The context of the earache: occurred in association with illness.  URI x one week. Left ear plugged and muffled. CC above noted and confirmed with the patient.Uses nasal steroid year round. Had prednisone and Zithromax last week. Felt better by week end. Now, sensation of fullness is back..  Associated symptoms consist of cough, nasal congestion, denies dizziness and denies vertigo.        Review of Systems   Constitutional:  Negative.    Eye:  Negative.    Ear/Nose/Mouth/Throat:  Negative except as documented in history of present illness.    Respiratory:  Negative except as documented in history of present illness.       Health Status   Allergies:    Allergic Reactions (All)  Severity Not Documented  Amoxicillin (Rash)  Clindamycin (Rash)  Percocet (Nausea)   Medications:  (Selected)   Prescriptions  Prescribed  Medrol Dosepak 4 mg oral tablet: 1 packet(s), PO, Once, Instructions: as directed on package labeling, # 21 tab(s), 0 Refill(s), Type: Soft Stop, Pharmacy: [facility], 1 packet(s) po once,Instr:as directed on  package labeling  betamethasone valerate 0.1% topical cream: 1 wilfredo, TOP, tid, # 45 g, 1 Refill(s), Type: Maintenance, Pharmacy: [facility], 1 wilfredo top tid  flunisolide 25 mcg/inh nasal spray: 2 puff(s), nasal, daily, # 1 EA, 5 Refill(s), Type: Maintenance, Pharmacy: [facility], 2 puff(s) nasal daily  hydroCHLOROthiazide-metoprolol 12.5 mg-100 mg oral tablet, extended release: 1 tab(s), po, daily, # 30 tab(s), 5 Refill(s), Type: Maintenance, Pharmacy: [facility], 1 tab(s) po daily  sertraline 50 mg oral tablet: 1 tab(s) ( 50 mg ), PO, Daily, # 30 tab(s), 5 Refill(s), Type: Maintenance, Pharmacy: [facility], 1 tab(s) po daily   Problem list:    All Problems  Allergic Rhinitis / ICD-9-.9 / Confirmed  Hypertension / ICD-9-.9 / Confirmed  Insomnia / ICD-9-.52 / Confirmed  Obesity / SNOMED CT 4068672292 / Probable  Depression / SNOMED CT 060602548 / Confirmed  Tobacco abuse / ICD-9-.1 / Confirmed  Inactive: Increased Blood Pressure (not Hypertension) / ICD-9-.2      Histories   Past Medical History:    Active  Allergic Rhinitis (477.9)  Tobacco abuse (305.1)   Family History:    Hypertension  Mother     Procedure history:    Discectomy ([id number]) in the month of 11/2003 at 38 Years.   Social History:        Alcohol Assessment: Current            Current, 1-2 times per week      Tobacco Assessment: Current            Current, Snuff                     Comments:                      11/27/2012 - Tania Mathis                     daily      Substance Abuse Assessment: Denies Substance Abuse      Exercise and Physical Activity Assessment: Regular exercise        Physical Examination   Vital Signs   3/28/2017 8:38 AM CDT Temperature Tympanic 97.0 DegF  LOW    Peripheral Pulse Rate 72 bpm    Pulse Site Radial artery    HR Method Manual    Systolic Blood Pressure 146 mmHg  HI    Diastolic Blood Pressure 88 mmHg    Mean Arterial Pressure 107 mmHg    BP Site Left arm     BP Method Manual      Measurements from flowsheet : Measurements   3/28/2017 8:38 AM CDT Height Measured - Standard 72 in    Weight Measured - Standard 230 lb    BSA 2.3 m2    Body Mass Index 31.19 kg/m2      General:  Alert and oriented, No acute distress.    Eye:  Pupils are equal, round and reactive to light, Extraocular movements are intact, Normal conjunctiva.    HENT:  Normocephalic, Oral mucosa is moist, No pharyngeal erythema.         Ear: Left ear, Tympanic membrane ( Intact, Not erythematous, Fluid in middle ear ).         Nose: Both nostrils, Discharge ( Small amount, Clear ).    Neck:  Supple, Non-tender, No lymphadenopathy.       Impression and Plan   Diagnosis     ETD (eustachian tube dysfunction) (FQV91-KM H69.80).     Patient Instructions:       Counseled: Patient, Regarding diagnosis, Regarding treatment, Regarding medications, Activity, Verbalized understanding.    Orders     Orders (Selected)   Prescriptions  Prescribed  Medrol Dosepak 4 mg oral tablet: 1 packet(s), PO, Once, Instructions: as directed on package labeling, # 21 tab(s), 0 Refill(s), Type: Soft Stop, Pharmacy: [facility], 1 packet(s) po once,Instr:as directed on package labeling.     Take medicine as prescribed, side effects discussed.  Tylenol/ibuprofen for fever and discomfort.  Push fluids.  RTC if not improving in 36-48 hours, prior if concerns as we have discussed.

## 2022-02-15 NOTE — PROGRESS NOTES
Patient:   JESE CALLEJAS            MRN: [id number]            FIN: [id number]               Age:   55 years     Sex:  Male     :  1965   Associated Diagnoses:   Obesity; Prostate cancer screening; Type 2 diabetes mellitus; Hypertension   Author:   To Blackburn PA-C      Report Summary   Diagnosis  Obesity (ZXN66-DX E66.9).  Type 2 diabetes mellitus (VKQ31-MV E11.9).  Patient InstructionsSummaryOrders   Visit Information      Date of Service: 2021 08:38 am  Performing Location: [facility]  Encounter#: [id number]      Primary Care Provider (PCP):  Santsoh Leiva MD    NPI# [id number]      Referring Provider:  To Blackburn PA-C    NPI# [id number]   Visit type:  General concerns.    Accompanied by:  No one.    Source of history:  Self, Medical record.    History limitation:  None.       Chief Complaint   3/12/2021 8:43 AM CST    HTN/DM med check, due for labs        History of Present Illness             The patient presents for follow-up evaluation of hypertension.  The quality of hypertension symptom(s) since the patient's last visit is described as increased, headache.  Since the patient's last visit, the timing/course of hypertension symptom(s) fluctuates in intensity and is worsening.  Feels like pressure is up. .  Compliance problems: Has cut back on medications. Glucose readings have been running in the 110's to 120's..        Review of Systems   Constitutional:  Negative.    Respiratory:  Negative.    Cardiovascular:  Negative except as documented in history of present illness.    Endocrine:  Negative except as documented in history of present illness.       Health Status   Allergies:    Allergic Reactions (Selected)  Severity Not Documented  Amoxicillin (Rash)  Clindamycin (Rash)  Percocet (Nausea)   Medications:  (Selected)   Prescriptions  Prescribed  Metoprolol Succinate  mg oral tablet, extended release: = 1 tab(s) ( 100 mg ), Oral, daily, # 90 tab(s), 3 Refill(s),  Type: Maintenance, Pharmacy: [facility], 1 tab(s) Oral daily, 72, in, 03/12/21 8:43:00 CST, Height Measured, 204.4, lb, 03/12/21 8:43:00 CST, Weight Measured  hydrochlorothiazide-lisinopril 25 mg-20 mg oral tablet: 1 tab(s), Oral, daily, # 90 tab(s), 3 Refill(s), Type: Maintenance, Pharmacy: [facility], 1 tab(s) Oral daily, 72, in, 03/12/21 8:43:00 CST, Height Measured, 204.4, lb, 03/12/21 8:43:00 CST, Weight Measured  metFORMIN 500 mg oral tablet, extended release: = 1 tab(s) ( 500 mg ), Oral, bid, # 180 tab(s), 3 Refill(s), Type: Maintenance, Pharmacy: [facility], I did add lisinopril to his HCTZ, 1 tab(s) Oral bid, 72, in, 03/12/21 8:43:00 CST, Height Measured, 204.4, lb, 03/12/21 8:43:00 CST, Weight M...,    Medications          *denotes recorded medication          hydrochlorothiazide-lisinopril 25 mg-20 mg oral tablet: 1 tab(s), Oral, daily, 90 tab(s), 3 Refill(s).          metFORMIN 500 mg oral tablet, extended release: 500 mg, 1 tab(s), Oral, bid, 180 tab(s), 3 Refill(s).          Metoprolol Succinate  mg oral tablet, extended release: 100 mg, 1 tab(s), Oral, daily, 90 tab(s), 3 Refill(s).       Problem list:    All Problems (Selected)  Type 2 diabetes mellitus / SNOMED CT 912416783 / Confirmed  Tobacco abuse / ICD-9-.1 / Confirmed  Obesity / SNOMED CT 5618478328 / Probable  Insomnia / ICD-9-.52 / Confirmed  Hypertension / ICD-9-.9 / Confirmed  Depression / SNOMED CT 831399840 / Confirmed  Allergic Rhinitis / ICD-9-.9 / Confirmed      Histories   Past Medical History:    Active  Allergic Rhinitis (477.9)  Tobacco abuse (305.1)   Family History:    Hypertension  Mother     Procedure history:    Repair of inguinal hernia ([id number]) on 2/26/2019 at 53 Years.  Comments:  3/6/2019 12:42 PM ELZBIETA - Dora Turpin  left  Discectomy (6715234) in the month of 11/2003 at 38 Years.   Social History:        Electronic Cigarette/Vaping Assessment            Electronic  Cigarette Use: Never.      Alcohol Assessment: Current            Current, 3-5 times per week      Tobacco Assessment: Current            Never (less than 100 in lifetime)            Current, Snuff                     Comments:                      11/27/2012 - Tania Mathis                     daily      Substance Abuse Assessment: Denies Substance Abuse      Exercise and Physical Activity Assessment: Regular exercise        Physical Examination   Vital Signs   3/12/2021 8:43 AM CST Peripheral Pulse Rate 74 bpm    HR Method Electronic    Systolic Blood Pressure 182 mmHg  HI    Diastolic Blood Pressure 115 mmHg  HI    Mean Arterial Pressure 137 mmHg    BP Site Right arm    BP Method Electronic      Measurements from flowsheet : Measurements   3/12/2021 8:43 AM CST Height Measured - Standard 72 in    Weight Measured - Standard 204.4 lb    BSA 2.17 m2    Body Mass Index 27.72 kg/m2  HI      General:  Alert and oriented, No acute distress.    Eye:  Normal conjunctiva.    HENT:  Normocephalic, Tympanic membranes are clear, Oral mucosa is moist, No pharyngeal erythema.    Neck:  Supple, Non-tender, No lymphadenopathy.    Respiratory:  Lungs are clear to auscultation, Respirations are non-labored.    Cardiovascular:  Normal rate, Regular rhythm.    Gastrointestinal:  Soft, Non-tender, Non-distended, No organomegaly.       Impression and Plan   Diagnosis     Obesity (MMI17-US E66.9).     Prostate cancer screening (IUV72-QH Z12.5).     Type 2 diabetes mellitus (WBY11-JR E11.9).     Hypertension (DQH75-CC I10).     Patient Instructions:       Counseled: Patient, Regarding diagnosis, Regarding treatment, Regarding medications, Diet, Activity, Verbalized understanding.    Summary:  Added lisinopril to his Meds. Probably should be back on statin. Labs pending. Refuses colon cancer screening. BP checks in one month on new Meds..    Orders     Orders (Selected)   Outpatient Orders  Ordered (Dispatched)  CBC (h/h, RBC, indices,  WBC, Plt)* ([facility]): Specimen Type: Blood, Collection Date: 03/12/21 8:50:00 CST  Comprehensive Metabolic Panel* ([facility]): Specimen Type: Serum, Collection Date: 03/12/21 8:50:00 CST  Hemoglobin A1c* ([facility]): Specimen Type: Blood, Collection Date: 03/12/21 8:50:00 CST  Lipid panel with reflex to direct ldl* ([facility]): Specimen Type: Serum, Collection Date: 03/12/21 8:50:00 CST  PSA, Total (Room)* ([facility]): Specimen Type: Serum, Collection Date: 03/12/21 8:50:00 CST  Completed  [id number] office o/p est mod 30-39 min (Charge): Quantity: 1, Type 2 diabetes mellitus  Obesity  Hypertension  Prostate cancer screening  Prescriptions  Prescribed  Metoprolol Succinate  mg oral tablet, extended release: = 1 tab(s) ( 100 mg ), Oral, daily, # 90 tab(s), 3 Refill(s), Type: Maintenance, Pharmacy: [facility], 1 tab(s) Oral daily, 72, in, 03/12/21 8:43:00 CST, Height Measured, 204.4, lb, 03/12/21 8:43:00 CST, Weight Measured  hydrochlorothiazide-lisinopril 25 mg-20 mg oral tablet: 1 tab(s), Oral, daily, # 90 tab(s), 3 Refill(s), Type: Maintenance, Pharmacy: [facility], 1 tab(s) Oral daily, 72, in, 03/12/21 8:43:00 CST, Height Measured, 204.4, lb, 03/12/21 8:43:00 CST, Weight Measured  metFORMIN 500 mg oral tablet, extended release: = 1 tab(s) ( 500 mg ), Oral, bid, # 180 tab(s), 3 Refill(s), Type: Maintenance, Pharmacy: [facility], I did add lisinopril to his HCTZ, 1 tab(s) Oral bid, 72, in, 03/12/21 8:43:00 CST, Height Measured, 204.4, lb, 03/12/21 8:43:00 CST, Weight M....

## 2022-02-15 NOTE — TELEPHONE ENCOUNTER
Entered by Marta Artis CMA on June 29, 2020 4:02:56 PM CDT  ---------------------  From: Marta Artis CMA   To: [facility]    Sent: 6/29/2020 4:02:52 PM CDT  Subject: Medication Management     ** Not Approved: Refill not appropriate, Pt now takes Metop/HCTZ? **  metoprolol (METOPROL SUC 100MG ER)  TAKE ONE (1) TABLET DAILY  Qty:  90 tab(s)        Days Supply:  90        Refills:  0          Substitutions Allowed     Route To Pharmacy - [facility]   Signed by Marta Artis CMA            ** Patient matched by Marta Artis CMA on 6/29/2020 4:00:54 PM CDT **      ------------------------------------------  From: [facility]  To: Santosh Leiva MD  Sent: June 29, 2020 3:43:28 PM CDT  Subject: Medication Management  Due: June 24, 2020 10:20:04 PM CDT     ** On Hold Pending Signature **     Drug: metoprolol (Metoprolol Succinate  mg oral tablet, extended release), TAKE ONE (1) TABLET DAILY  Quantity: 90 tab(s)  Days Supply: 90  Refills: 0  Substitutions Allowed  Notes from Pharmacy:     Dispensed Drug: metoprolol (Metoprolol Succinate  mg oral tablet, extended release), TAKE ONE (1) TABLET DAILY  Quantity: 90 tab(s)  Days Supply: 90  Refills: 0  Substitutions Allowed  Notes from Pharmacy:  ------------------------------------------

## 2022-03-02 NOTE — PROGRESS NOTES
Patient:   JESE CALLEJAS            MRN: [id number]            FIN: [id number]               Age:   56 years     Sex:  Male     :  1965   Associated Diagnoses:   LUPE (generalized anxiety disorder); Depression   Author:   To Blackburn PA-C      Report Summary   Diagnosis  LUPE (generalized anxiety disorder) (ZCF25-XV F41.1).  Patient InstructionsOrders   Visit Information      Date of Service: 02/15/2022 06:33 am  Performing Location: [facility]  Encounter#: [id number]      Primary Care Provider (PCP):  To Blackburn PA-C    NPI# [id number]      Referring Provider:  To Blackburn PA-C, NPI# [id number]   Visit type:  Telephone Encounter.    Source of history:  Patient.    Location of patient:  Home  Call Start Time:   1425  Call End Time:    1430      Chief Complaint   Lexapro refill. 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 Doing OK. Due for in person visit soon for HTN and DM FU.  Not suicidal.   _      History of Present Illness   Today's visit was conducted via telephone due to the COVID-19 pandemic. Patient's consent to telephone visit was obtained and documented.      Reason for visit:  As above      Review of Systems   Constitutional:  Negative.    Cardiovascular:  Negative except as documented in history of present illness.    Psychiatric:  Negative except as documented in history of present illness.       Impression and Plan   Diagnosis     LUPE (generalized anxiety disorder) (JRP75-PP F41.1).     Depression (CKG51-JJ F33.1).     Patient Instructions:       Counseled: Patient, Regarding diagnosis, Regarding treatment, Regarding medications, Activity, Verbalized understanding.    Orders     Orders (Selected)   Prescriptions  Prescribed  escitalopram 10 mg oral tablet: = 1 tab(s), Oral, daily, x 30 day(s), # 30 tab(s), 0 Refill(s), Type: Physician Stop, Pharmacy: [facility], 1 tab(s) Oral daily,x30 day(s), 72, in, 21 8:43:00 CST, Height Measured, 192.9,  lb, 10/14/21 9:31:00 CDT, Weight Measured.     In person for chronic disease visit.        Health Status   Allergies:    Allergic Reactions (Selected)  Severity Not Documented  Amoxicillin (Rash)  Clindamycin (Rash)  Percocet (Nausea)   Medications:  (Selected)   Prescriptions  Prescribed  escitalopram 10 mg oral tablet: = 1 tab(s), Oral, daily, x 30 day(s), # 30 tab(s), 0 Refill(s), Type: Physician Stop, Pharmacy: [facility], 1 tab(s) Oral daily,x30 day(s), 72, in, 03/12/21 8:43:00 CST, Height Measured, 192.9, lb, 10/14/21 9:31:00 CDT, Weight Measured  hydrochlorothiazide-lisinopril 25 mg-20 mg oral tablet: 1 tab(s), Oral, daily, # 90 tab(s), 3 Refill(s), Type: Maintenance, Pharmacy: [facility], 1 tab(s) Oral daily, 72, in, 03/12/21 8:43:00 CST, Height Measured, 204.4, lb, 03/12/21 8:43:00 CST, Weight Measured  metFORMIN 500 mg oral tablet, extended release: = 1 tab(s) ( 500 mg ), Oral, bid, # 180 tab(s), 3 Refill(s), Type: Maintenance, Pharmacy: [facility], I did add lisinopril to his HCTZ, 1 tab(s) Oral bid, 72, in, 03/12/21 8:43:00 CST, Height Measured, 204.4, lb, 03/12/21 8:43:00 CST, Weight M...  Documented Medications  Documented  metoprolol succinate 100 mg oral capsule, extended release: = 1 cap(s) ( 100 mg ), Oral, daily, 0 Refill(s), Type: Maintenance   Problem list:    All Problems  Type 2 diabetes mellitus / SNOMED CT 745765128 / Confirmed  Tobacco abuse / ICD-9-.1 / Confirmed  Obesity / SNOMED CT 2524367535 / Probable  Insomnia / ICD-9-.52 / Confirmed  Hypertension / ICD-9-.9 / Confirmed  LUPE (generalized anxiety disorder) / SNOMED CT 19986904 / Confirmed  ETOH abuse / SNOMED CT 25416595 / Confirmed  Depression / SNOMED CT 074574900 / Confirmed  Allergic Rhinitis / ICD-9-.9 / Confirmed  Inactive: Increased Blood Pressure (not Hypertension) / ICD-9-.2      Histories   Past Medical History:    Active  Allergic Rhinitis (477.9)  Tobacco abuse (305.1)    Family History:    Hypertension  Mother     Procedure history:    Repair of inguinal hernia ([id number]) on 2/26/2019 at 53 Years.  Comments:  3/6/2019 12:42 PM CST - Papi Airami  left  Discectomy (7145652) in the month of 11/2003 at 38 Years.   Social History:        Electronic Cigarette/Vaping Assessment            Electronic Cigarette Use: Never.      Alcohol Assessment: Current            Current, 3-5 times per week      Tobacco Assessment: Current            Never (less than 100 in lifetime)            Current, Snuff                     Comments:                      11/27/2012 - Tania Mathis                     daily      Substance Abuse Assessment: Denies Substance Abuse      Exercise and Physical Activity Assessment: Regular exercise        Health Maintenance      Recommendations     Pending (in the next year)        OverDue           DM - Microalbumin due  09/19/20  and every 1  year(s)           DM - HgbA1c due  06/12/21  and every 3  month(s)           Influenza Vaccine due  09/01/21  and every 1  year(s)        Due            Aspirin Therapy for Prevention of CVD and CRC due  02/15/22  and every 5  year(s)           COVID-19 Vaccine Not Started due  02/15/22  One-time only           Colorectal Cancer Screen (Colonoscopy) due  02/15/22  Variable frequency           Colorectal Cancer Screen (Occult Blood) due  02/15/22  Variable frequency           Colorectal Cancer Screen (Sigmoidoscopy) due  02/15/22  Variable frequency           DM - Communication with Managing Provider due  02/15/22  and every 1  year(s)           DM - Eye Exam due  02/15/22  and every 1  year(s)           Lung Cancer Screen due  02/15/22  and every 1  year(s)        Near Due            Body Mass Index Check near due  03/12/22  and every 1  year(s)           DM - Foot Exam near due  03/12/22  and every 1  year(s)           Alcohol Misuse Screen near due  03/12/22  and every 1  year(s)           Lipid Disorders Screen near due   03/12/22  and every 1  year(s)        Due In Future            High Blood Pressure Screen not due until  10/14/22  and every 1  year(s)           Obesity Screen and Counseling not due until  10/14/22  and every 1  year(s)           Depression Screen not due until  10/14/22  and every 1  year(s)     Satisfied (in the past 1 year)        Satisfied            Alcohol Misuse Screen on  03/12/21.           Alcohol Misuse Screen on  03/12/21.           Body Mass Index Check on  03/12/21.           DM - Foot Exam on  03/12/21.           DM - HgbA1c on  03/12/21.           Depression Screen on  10/14/21.           Depression Screen on  10/14/21.           Depression Screen on  10/14/21.           Depression Screen on  03/12/21.           Depression Screen on  03/12/21.           Depression Screen on  03/12/21.           High Blood Pressure Screen on  10/14/21.           High Blood Pressure Screen on  03/12/21.           Lipid Disorders Screen on  03/12/21.           Lipid Disorders Screen on  03/12/21.           Lipid Disorders Screen on  03/12/21.           Lipid Disorders Screen on  03/12/21.           Obesity Screen and Counseling on  10/14/21.           Obesity Screen and Counseling on  03/12/21.

## 2022-03-02 NOTE — NURSING NOTE
Comprehensive Intake Entered On:  2/15/2022 2:18 PM CST    Performed On:  2/15/2022 2:16 PM CST by Yu Pascual CMA               Summary   Chief Complaint :   anxiety medication check verbal consent given for telephone visit   Race :      Languages :   English   Ethnicity :   Not  or    Yu Pascual CMA - 2/15/2022 2:16 PM CST   Health Status   Allergies Verified? :   Yes   Medication History Verified? :   Yes   Medical History Verified? :   No   Pre-Visit Planning Status :   Not completed   Tobacco Use? :   Never smoker   uY Pascual CMA - 2/15/2022 2:16 PM CST   Consents   Consent for Immunization Exchange :   Consent Granted   Consent for Immunizations to Providers :   Consent Granted   Yu Pascual CMA - 2/15/2022 2:16 PM CST   Meds / Allergies   (As Of: 2/15/2022 2:18:31 PM CST)   Allergies (Active)   amoxicillin  Estimated Onset Date:   Unspecified ; Reactions:   rash ; Created By:   Sarah Alston LPN; Reaction Status:   Active ; Substance:   amoxicillin ; Updated By:   Sarah Alston LPN; Reviewed Date:   2/15/2022 2:17 PM CST      clindamycin  Estimated Onset Date:   Unspecified ; Reactions:   rash ; Created By:   Sarah Alston LPN; Reaction Status:   Active ; Category:   Drug ; Substance:   clindamycin ; Type:   Allergy ; Updated By:   Sarah Alston LPN; Reviewed Date:   2/15/2022 2:17 PM CST      percocet  Estimated Onset Date:   Unspecified ; Reactions:   nausea ; Created By:   Sarah Alston LPN; Reaction Status:   Active ; Substance:   percocet ; Updated By:   aSrah Alston LPN; Reviewed Date:   2/15/2022 2:17 PM CST        Medication List   (As Of: 2/15/2022 2:18:31 PM CST)   Prescription/Discharge Order    escitalopram  :   escitalopram ; Status:   Prescribed ; Ordered As Mnemonic:   escitalopram 10 mg oral tablet ; Simple Display Line:   1 tab(s), Oral, daily, 90 tab(s), 0 Refill(s) ; Ordering Provider:   To Blackburn PA-C; Catalog Code:    escitalopram ; Order Dt/Tm:   2/10/2022 8:14:10 AM CST          hydrochlorothiazide-lisinopril  :   hydrochlorothiazide-lisinopril ; Status:   Prescribed ; Ordered As Mnemonic:   hydrochlorothiazide-lisinopril 25 mg-20 mg oral tablet ; Simple Display Line:   1 tab(s), Oral, daily, 90 tab(s), 3 Refill(s) ; Ordering Provider:   To Blackburn PA-C; Catalog Code:   hydrochlorothiazide-lisinopril ; Order Dt/Tm:   3/12/2021 9:07:41 AM CST          metFORMIN  :   metFORMIN ; Status:   Prescribed ; Ordered As Mnemonic:   metFORMIN 500 mg oral tablet, extended release ; Simple Display Line:   500 mg, 1 tab(s), Oral, bid, 180 tab(s), 3 Refill(s) ; Ordering Provider:   To Blackburn PA-C; Catalog Code:   metFORMIN ; Order Dt/Tm:   3/12/2021 9:08:28 AM CST            Social History   Social History   (As Of: 2/15/2022 2:18:31 PM CST)   Alcohol:  Current      Current, 3-5 times per week   (Last Updated: 10/19/2017 4:47:52 PM CDT by Marta Artis CMA)          Tobacco:  Current      Current, Snuff   (Last Updated: 4/9/2010 2:14:59 PM CDT by Mirella Siddiqi CMA)    Comments:  11/27/2012 3:16 PM - Tania Mathis: daily   (Last Updated: 11/27/2012 3:16:07 PM CST by Tania Mathis)   Never (less than 100 in lifetime)   (Last Updated: 3/12/2021 8:47:40 AM CST by Lauren Sy CMA)          Electronic Cigarette/Vaping:        Electronic Cigarette Use: Never.   (Last Updated: 3/12/2021 8:47:44 AM CST by Lauren Sy CMA)          Substance Abuse:  Denies Substance Abuse      (Last Updated: 11/27/2012 3:16:13 PM CST by Tania Mathis )         Exercise:  Regular exercise      (Last Updated: 4/9/2010 2:15:45 PM CDT by Mirella Siddiqi CMA )           OB/GYN History   Pregnancy History   (As Of: 2/15/2022 2:18:31 PM CST)   No pregnancy history documented

## 2022-03-02 NOTE — NURSING NOTE
Comprehensive Intake Entered On:  1/17/2022 2:19 PM CST    Performed On:  1/17/2022 2:16 PM CST by Sonya Kebede               Summary   Chief Complaint :   Verbal consent given for telephone visit. c/o sinus HA and pressure    Race :      Languages :   English   Ethnicity :   Not  or    Sonya Kebede - 1/17/2022 2:16 PM CST   Health Status   Allergies Verified? :   Yes   Medication History Verified? :   Yes   Medical History Verified? :   Yes   Pre-Visit Planning Status :   Completed   Tobacco Use? :   Current every day smoker   Sonya Kebede - 1/17/2022 2:16 PM CST   Meds / Allergies   (As Of: 1/17/2022 2:19:05 PM CST)   Allergies (Active)   amoxicillin  Estimated Onset Date:   Unspecified ; Reactions:   rash ; Created By:   Sarah Alston LPN; Reaction Status:   Active ; Substance:   amoxicillin ; Updated By:   Sarah Alston LPN; Reviewed Date:   1/17/2022 2:18 PM CST      clindamycin  Estimated Onset Date:   Unspecified ; Reactions:   rash ; Created By:   Sarah Alston LPN; Reaction Status:   Active ; Category:   Drug ; Substance:   clindamycin ; Type:   Allergy ; Updated By:   Sarah Alston LPN; Reviewed Date:   1/17/2022 2:18 PM CST      percocet  Estimated Onset Date:   Unspecified ; Reactions:   nausea ; Created By:   Sarah Alston LPN; Reaction Status:   Active ; Substance:   percocet ; Updated By:   Sarah Alston LPN; Reviewed Date:   1/17/2022 2:18 PM CST        Medication List   (As Of: 1/17/2022 2:19:05 PM CST)   Prescription/Discharge Order    OLANZapine  :   OLANZapine ; Status:   Processing ; Ordered As Mnemonic:   OLANZapine 5 mg oral tablet ; Ordering Provider:   To Blackburn PA-C; Action Display:   Complete ; Catalog Code:   OLANZapine ; Order Dt/Tm:   1/17/2022 2:18:33 PM CST          metFORMIN  :   metFORMIN ; Status:   Prescribed ; Ordered As Mnemonic:   metFORMIN 500 mg oral tablet, extended release ; Simple Display Line:   500 mg, 1  tab(s), Oral, bid, 180 tab(s), 3 Refill(s) ; Ordering Provider:   To Blackburn PA-C; Catalog Code:   metFORMIN ; Order Dt/Tm:   3/12/2021 9:08:28 AM CST          hydrOXYzine  :   hydrOXYzine ; Status:   Processing ; Ordered As Mnemonic:   hydrOXYzine pamoate 25 mg oral capsule ; Ordering Provider:   To Blackburn PA-C; Action Display:   Complete ; Catalog Code:   hydrOXYzine ; Order Dt/Tm:   1/17/2022 2:18:33 PM CST          hydrochlorothiazide-lisinopril  :   hydrochlorothiazide-lisinopril ; Status:   Prescribed ; Ordered As Mnemonic:   hydrochlorothiazide-lisinopril 25 mg-20 mg oral tablet ; Simple Display Line:   1 tab(s), Oral, daily, 90 tab(s), 3 Refill(s) ; Ordering Provider:   To Blackburn PA-C; Catalog Code:   hydrochlorothiazide-lisinopril ; Order Dt/Tm:   3/12/2021 9:07:41 AM CST          escitalopram  :   escitalopram ; Status:   Prescribed ; Ordered As Mnemonic:   Lexapro 10 mg oral tablet ; Simple Display Line:   10 mg, 1 tab(s), Oral, daily, 90 tab(s), 0 Refill(s) ; Ordering Provider:   To Blackburn PA-C; Catalog Code:   escitalopram ; Order Dt/Tm:   11/8/2021 4:02:15 PM CST

## 2022-03-02 NOTE — TELEPHONE ENCOUNTER
Entered by To Blackburn PA-C on February 10, 2022 8:14:14 AM CST  ---------------------  From: To Blackburn PA-C   To: [facility]    Sent: 2/10/2022 8:14:14 AM CST  Subject: Medication Management     ** Submitted: **  Complete:escitalopram (Lexapro 10 mg oral tablet)   Signed by To Blackburn PA-C  2/10/2022 2:14:00 PM [facility]    ** Approved **  escitalopram (ESCITALOPRAM 10MG)  TAKE ONE (1) TABLET BY MOUTH DAILY  Qty:  90 tab(s)        Days Supply:  90        Refills:  0          Substitutions Allowed     Route To Pharmacy - [facility]               ------------------------------------------  From: [facility]  To: To Blackburn PA-C  Sent: February 9, 2022 3:23:55 PM CST  Subject: Medication Management  Due: February 9, 2022 2:38:53 AM CST     ** On Hold Pending Signature **     Drug: escitalopram (escitalopram 10 mg oral tablet), TAKE ONE (1) TABLET BY MOUTH DAILY  Quantity: 90 tab(s)  Days Supply: 90  Refills: 0  Substitutions Allowed  Notes from Pharmacy:     Dispensed Drug: escitalopram (escitalopram 10 mg oral tablet), TAKE ONE (1) TABLET BY MOUTH DAILY  Quantity: 90 tab(s)  Days Supply: 90  Refills: 0  Substitutions Allowed  Notes from Pharmacy:  ------------------------------------------

## 2022-03-02 NOTE — PROGRESS NOTES
Chief Complaint    Verbal consent given for telephone visit. c/o sinus HA and pressure  History of Present Illness       Patient is a 56-year-old male who complains of sinus pain and pressure which has gotten worse since yesterday.       He reports having a head cold for 2 to 3 weeks.  He has had sinus pressure for about 2 weeks.  Then yesterday it felt like someone hit him right between his eyes.  He is having a lot of pain behind his eyes.  No change in his vision.  His temp was up to 99.2 last night       He denies tooth pain       No sore throat, myalgias       He has a history of sinus infections but has not had one in several years       He did not get tested for COVID for this illness  Review of Systems       Negative except as listed in HPI  Physical Exam       Telemedicine visit       In general he is alert, oriented, and in no acute distress       Breathing is not labored  Assessment/Plan       Sinusitis (J32.9)         Ordered:          doxycycline, = 1 tab(s) ( 100 mg ), Oral, bid, x 10 day(s), # 20 tab(s), 0 Refill(s), Type: Acute, Pharmacy: [facility], 1 tab(s) Oral bid,x10 day(s), 72, in, 03/12/21 8:43:00 CST, Height Measured, 192.9, lb, 10/14/21 9:31:00 CDT, Weight Measured, (Ordered)                As he has now been sick for 2 to 3 weeks there is not much reason to test him for COVID at this point       I did recommend to him that in the future when he has even mild cold symptoms that he should be tested for COVID right away as the severity of illness can vary in each individual       He is allergic to amoxicillin so we will treat his sinusitis with doxycycline twice daily for 10 days       Tylenol and ibuprofen as needed       Follow-up if not improving as anticipated  Patient Information     Name:JESE CALLEJAS      Address:      [address] [place] [id number]     Sex:Male     YOB: 1965     [phone]     MRN:[id number]     [id number]     Location:  [facility]     Date of Service:01/17/2022      Primary Care Physician:       To Blackburn PA-C, [phone]      Attending Physician:       Rama Liriano MD, [phone]  Problem List/Past Medical History    Ongoing     Allergic Rhinitis     Depression     ETOH abuse     LUPE (generalized anxiety disorder)     Hypertension     Increased Blood Pressure (not Hypertension)     Insomnia     Obesity     Tobacco abuse     Type 2 diabetes mellitus    Historical     No qualifying data  Procedure/Surgical History     Repair of inguinal hernia (02/26/2019)      Comments: left.     Discectomy (11/2003)  Medications    doxycycline hyclate 100 mg oral tablet, 100 mg= 1 tab(s), Oral, bid    hydrochlorothiazide-lisinopril 25 mg-20 mg oral tablet, 1 tab(s), Oral, daily, 3 refills    Lexapro 10 mg oral tablet, 10 mg= 1 tab(s), Oral, daily    metFORMIN 500 mg oral tablet, extended release, 500 mg= 1 tab(s), Oral, bid, 3 refills  Allergies    amoxicillin (rash)    clindamycin (rash)    percocet (nausea)  Social History    Smoking Status     Current every day smoker     Alcohol - Current      Current, 3-5 times per week     Electronic Cigarette/Vaping      Electronic Cigarette Use: Never.     Exercise - Regular exercise     Substance Abuse - Denies Substance Abuse     Tobacco - Current      Never (less than 100 in lifetime)  Family History    Hypertension: Mother.    Father: History is unknown  Immunizations       Scheduled Immunizations       Dose Date(s)       DTaP       03/07/1974       measles       03/07/1974       rubella       03/07/1974       tetanus/diphth/pertuss (Tdap) adult/adol       06/28/2007, 09/09/2018       Other Immunizations               tetanus/diphth/pertuss (Tdap) adult/adol       01/01/2007

## 2022-03-15 DIAGNOSIS — I10 BENIGN ESSENTIAL HYPERTENSION: Primary | ICD-10-CM

## 2022-03-16 RX ORDER — LISINOPRIL AND HYDROCHLOROTHIAZIDE 20; 25 MG/1; MG/1
1 TABLET ORAL DAILY
COMMUNITY
Start: 2021-03-12 | End: 2022-03-21

## 2022-03-16 RX ORDER — ESCITALOPRAM OXALATE 10 MG/1
10 TABLET ORAL DAILY
COMMUNITY
Start: 2022-02-15 | End: 2022-03-21

## 2022-03-16 RX ORDER — HYDROXYZINE PAMOATE 25 MG/1
25 CAPSULE ORAL
COMMUNITY
Start: 2021-11-08 | End: 2022-03-21

## 2022-03-16 RX ORDER — METFORMIN HCL 500 MG
500 TABLET, EXTENDED RELEASE 24 HR ORAL 2 TIMES DAILY
COMMUNITY
Start: 2021-03-12 | End: 2022-03-21

## 2022-03-17 RX ORDER — METOPROLOL SUCCINATE 100 MG/1
100 TABLET, EXTENDED RELEASE ORAL DAILY
Qty: 90 TABLET | Refills: 0 | Status: SHIPPED | OUTPATIENT
Start: 2022-03-17 | End: 2022-03-21

## 2022-03-17 NOTE — TELEPHONE ENCOUNTER
I sent in until his next appt.    ANDRES    Routing refill request to provider for review/approval because:  Patient needs to be seen because: HTN, DM f/u and labs.    Last Written Prescription Date: 2/15/22 documented medication in [facility].   3/12/21 CMP, lipid panel, A1c, PSA and CBC completed.     Future Office Visit:   Next 5 appointments (look out 90 days)    Mar 21, 2022 10:20 AM  (Arrive by 10:00 AM)  Provider Visit with CHANCE Graf  [facility] ([facility] ) [address] [place] [id number] [phone]

## 2022-03-21 ENCOUNTER — OFFICE VISIT (OUTPATIENT)
Dept: FAMILY MEDICINE | Facility: CLINIC | Age: 57
End: 2022-03-21
Payer: COMMERCIAL

## 2022-03-21 VITALS
HEIGHT: 72 IN | SYSTOLIC BLOOD PRESSURE: 152 MMHG | OXYGEN SATURATION: 98 % | RESPIRATION RATE: 16 BRPM | HEART RATE: 67 BPM | BODY MASS INDEX: 28.31 KG/M2 | WEIGHT: 209 LBS | DIASTOLIC BLOOD PRESSURE: 100 MMHG

## 2022-03-21 DIAGNOSIS — E11.9 TYPE 2 DIABETES MELLITUS WITHOUT COMPLICATION, WITHOUT LONG-TERM CURRENT USE OF INSULIN (H): Primary | ICD-10-CM

## 2022-03-21 DIAGNOSIS — I10 PRIMARY HYPERTENSION: ICD-10-CM

## 2022-03-21 DIAGNOSIS — I10 BENIGN ESSENTIAL HYPERTENSION: ICD-10-CM

## 2022-03-21 DIAGNOSIS — F41.1 GAD (GENERALIZED ANXIETY DISORDER): ICD-10-CM

## 2022-03-21 LAB
ANION GAP SERPL CALCULATED.3IONS-SCNC: 5 MMOL/L (ref 3–14)
BUN SERPL-MCNC: 16 MG/DL (ref 7–30)
CALCIUM SERPL-MCNC: 9.6 MG/DL (ref 8.5–10.1)
CHLORIDE BLD-SCNC: 104 MMOL/L (ref 94–109)
CHOLEST SERPL-MCNC: 180 MG/DL
CO2 SERPL-SCNC: 26 MMOL/L (ref 20–32)
CREAT SERPL-MCNC: 0.93 MG/DL (ref 0.66–1.25)
CREAT UR-MCNC: 107 MG/DL
FASTING STATUS PATIENT QL REPORTED: YES
GFR SERPL CREATININE-BSD FRML MDRD: >90 ML/MIN/1.73M2
GLUCOSE BLD-MCNC: 135 MG/DL (ref 70–99)
HBA1C MFR BLD: 6.9 % (ref 0–5.6)
HDLC SERPL-MCNC: 29 MG/DL
LDLC SERPL CALC-MCNC: 113 MG/DL
MICROALBUMIN UR-MCNC: 14 MG/L
MICROALBUMIN/CREAT UR: 13.08 MG/G CR (ref 0–17)
NONHDLC SERPL-MCNC: 151 MG/DL
POTASSIUM BLD-SCNC: 4.4 MMOL/L (ref 3.4–5.3)
SODIUM SERPL-SCNC: 135 MMOL/L (ref 133–144)
TRIGL SERPL-MCNC: 192 MG/DL

## 2022-03-21 PROCEDURE — 80061 LIPID PANEL: CPT | Performed by: PHYSICIAN ASSISTANT

## 2022-03-21 PROCEDURE — 36415 COLL VENOUS BLD VENIPUNCTURE: CPT | Performed by: PHYSICIAN ASSISTANT

## 2022-03-21 PROCEDURE — 80048 BASIC METABOLIC PNL TOTAL CA: CPT | Performed by: PHYSICIAN ASSISTANT

## 2022-03-21 PROCEDURE — 82043 UR ALBUMIN QUANTITATIVE: CPT | Performed by: PHYSICIAN ASSISTANT

## 2022-03-21 PROCEDURE — 96127 BRIEF EMOTIONAL/BEHAV ASSMT: CPT | Performed by: PHYSICIAN ASSISTANT

## 2022-03-21 PROCEDURE — 83036 HEMOGLOBIN GLYCOSYLATED A1C: CPT | Performed by: PHYSICIAN ASSISTANT

## 2022-03-21 PROCEDURE — 99214 OFFICE O/P EST MOD 30 MIN: CPT | Performed by: PHYSICIAN ASSISTANT

## 2022-03-21 RX ORDER — LISINOPRIL AND HYDROCHLOROTHIAZIDE 20; 25 MG/1; MG/1
1 TABLET ORAL DAILY
Qty: 90 TABLET | Refills: 1 | Status: SHIPPED | OUTPATIENT
Start: 2022-03-21 | End: 2022-09-14

## 2022-03-21 RX ORDER — ESCITALOPRAM OXALATE 10 MG/1
10 TABLET ORAL DAILY
Qty: 90 TABLET | Refills: 1 | Status: SHIPPED | OUTPATIENT
Start: 2022-03-21 | End: 2022-09-14

## 2022-03-21 RX ORDER — METFORMIN HCL 500 MG
500 TABLET, EXTENDED RELEASE 24 HR ORAL 2 TIMES DAILY
Qty: 180 TABLET | Refills: 1 | Status: SHIPPED | OUTPATIENT
Start: 2022-03-21 | End: 2022-09-14

## 2022-03-21 RX ORDER — AMLODIPINE BESYLATE 5 MG/1
5 TABLET ORAL DAILY
Qty: 90 TABLET | Refills: 1 | Status: SHIPPED | OUTPATIENT
Start: 2022-03-21 | End: 2022-09-14

## 2022-03-21 ASSESSMENT — ENCOUNTER SYMPTOMS
CARDIOVASCULAR NEGATIVE: 1
CONSTITUTIONAL NEGATIVE: 1
MUSCULOSKELETAL NEGATIVE: 1
GASTROINTESTINAL NEGATIVE: 1
RESPIRATORY NEGATIVE: 1
ENDOCRINE NEGATIVE: 1
EYES NEGATIVE: 1

## 2022-03-21 ASSESSMENT — ANXIETY QUESTIONNAIRES
2. NOT BEING ABLE TO STOP OR CONTROL WORRYING: SEVERAL DAYS
7. FEELING AFRAID AS IF SOMETHING AWFUL MIGHT HAPPEN: NOT AT ALL
GAD7 TOTAL SCORE: 4
7. FEELING AFRAID AS IF SOMETHING AWFUL MIGHT HAPPEN: NOT AT ALL
GAD7 TOTAL SCORE: 4
5. BEING SO RESTLESS THAT IT IS HARD TO SIT STILL: NOT AT ALL
6. BECOMING EASILY ANNOYED OR IRRITABLE: NOT AT ALL
GAD7 TOTAL SCORE: 4
3. WORRYING TOO MUCH ABOUT DIFFERENT THINGS: SEVERAL DAYS
1. FEELING NERVOUS, ANXIOUS, OR ON EDGE: SEVERAL DAYS
4. TROUBLE RELAXING: SEVERAL DAYS

## 2022-03-21 NOTE — LETTER
March 21, 2022      Corwin Vasquez  [address] [facility] [address]        Dear ,    We are writing to inform you of your test results.    Your test results fall within the expected range(s) or remain unchanged from previous results.  Please continue with current treatment plan. Your diabetes is under ideal control.    Resulted Orders   Basic metabolic panel  (Ca, Cl, CO2, Creat, Gluc, K, Na, BUN)   Result Value Ref Range    Sodium 135 133 - 144 mmol/L    Potassium 4.4 3.4 - 5.3 mmol/L    Chloride 104 94 - 109 mmol/L    Carbon Dioxide (CO2) 26 20 - 32 mmol/L    Anion Gap 5 3 - 14 mmol/L    Urea Nitrogen 16 7 - 30 mg/dL    Creatinine 0.93 0.66 - 1.25 mg/dL    Calcium 9.6 8.5 - 10.1 mg/dL    Glucose 135 (H) 70 - 99 mg/dL    GFR Estimate >90 >60 mL/min/1.73m2      Comment:      Effective December 21, 2021 eGFRcr in adults is calculated using the 2021 CKD-EPI creatinine equation which includes age and gender (Amor girard al., NE, DOI: 10.1056/NYCYfo3461742)   Hemoglobin A1c   Result Value Ref Range    Hemoglobin A1C 6.9 (H) 0.0 - 5.6 %      Comment:      Normal <5.7%   Prediabetes 5.7-6.4%    Diabetes 6.5% or higher     Note: Adopted from ADA consensus guidelines.   Lipid panel reflex to direct LDL Fasting   Result Value Ref Range    Cholesterol 180 <200 mg/dL    Triglycerides 192 (H) <150 mg/dL    Direct Measure HDL 29 (L) >=40 mg/dL    LDL Cholesterol Calculated 113 (H) <=100 mg/dL    Non HDL Cholesterol 151 (H) <130 mg/dL    Patient Fasting > 8hrs? Yes     Narrative    Cholesterol  Desirable:  <200 mg/dL    Triglycerides  Normal:  Less than 150 mg/dL  Borderline High:  150-199 mg/dL  High:  200-499 mg/dL  Very High:  Greater than or equal to 500 mg/dL    Direct Measure HDL  Female:  Greater than or equal to 50 mg/dL   Male:  Greater than or equal to 40 mg/dL    LDL Cholesterol  Desirable:  <100mg/dL  Above Desirable:  100-129 mg/dL   Borderline High:  130-159 mg/dL   High:  160-189 mg/dL   Very High:   >= 190 mg/dL    Non HDL Cholesterol  Desirable:  130 mg/dL  Above Desirable:  130-159 mg/dL  Borderline High:  160-189 mg/dL  High:  190-219 mg/dL  Very High:  Greater than or equal to 220 mg/dL   Albumin Random Urine Quantitative with Creat Ratio   Result Value Ref Range    Creatinine Urine mg/dL 107 mg/dL    Albumin Urine mg/L 14 mg/L    Albumin Urine mg/g Cr 13.08 0.00 - 17.00 mg/g Cr       If you have any questions or concerns, please call the clinic at the number listed above.       Sincerely,      CHANCE Graf

## 2022-03-21 NOTE — PROGRESS NOTES
Assessment & Plan     Type 2 diabetes mellitus without complication, without long-term current use of insulin (H)  Stable. Quit ETOH. Using more pop. Wonders about A1c.  - metFORMIN (GLUCOPHAGE-XR) 500 MG 24 hr tablet  Dispense: 180 tablet; Refill: 1  - Basic metabolic panel  (Ca, Cl, CO2, Creat, Gluc, K, Na, BUN)  - Hemoglobin A1c  - Lipid panel reflex to direct LDL Fasting  - Albumin Random Urine Quantitative with Creat Ratio    Benign essential hypertension  Uncontrolled Add norvasc 5 mg daily  - lisinopril-hydrochlorothiazide (ZESTORETIC) 20-25 MG tablet  Dispense: 90 tablet; Refill: 1    LUPE (generalized anxiety disorder)  Stable. No suicidal ideation  - escitalopram (LEXAPRO) 10 MG tablet  Dispense: 90 tablet; Refill: 1    Follow-up in six months and PRN         BMI:   Estimated body mass index is 28.35 kg/m  as calculated from the following:    Height as of this encounter: 1.829 m (6').    Weight as of this encounter: 94.8 kg (209 lb).   Weight management plan: Discussed healthy diet and exercise guidelines        No follow-ups on file.    CHANCE Wolf  [facility]    Luis Olivia is a 56 year old who presents for the following health issues: DM and HTN    56-year-old male with history of type 2 diabetes hypertension generalized anxiety disorder presents to the clinic for medication refill and labs he is concerned about home blood pressure readings have they have been significantly elevated  He has quit alcohol he is drinking more pop he wonders about his blood sugars  He is not suicidal  He has a new supervisor at work so far this has been going well there were some personality conflicts with prior supervisor    History of Present Illness       Mental Health Follow-up:  Patient presents to follow-up on Anxiety.    Patient's anxiety since last visit has been:  Better  The patient is not having other symptoms associated with anxiety.  Any significant life events:  No  Patient is feeling anxious or having panic attacks.  Patient has no concerns about alcohol or drug use.         Today's LUPE-7 Score: 4    Diabetes:   He presents for follow up of diabetes.  He is checking home blood glucose a few times a month. He checks blood glucose before meals.  Blood glucose is never over 200 and never under 70. He is aware of hypoglycemia symptoms including shakiness and weakness. He has no concerns regarding his diabetes at this time.  He is having numbness in feet.         Hypertension: He presents for follow up of hypertension.  He does not check blood pressure  regularly outside of the clinic. Outside blood pressures have been over 140/90. He does not follow a low salt diet.     He eats 0-1 servings of fruits and vegetables daily.He consumes 2 sweetened beverage(s) daily.He exercises with enough effort to increase his heart rate 60 or more minutes per day.    He is taking medications regularly.             Review of Systems   Constitutional: Negative.    HENT: Negative.    Eyes: Negative.    Respiratory: Negative.    Cardiovascular: Negative.    Gastrointestinal: Negative.    Endocrine: Negative.    Musculoskeletal: Negative.             Objective    BP (!) 152/100   Pulse 67   Resp 16   Ht 1.829 m (6')   Wt 94.8 kg (209 lb)   SpO2 98%   BMI 28.35 kg/m    Body mass index is 28.35 kg/m .  Physical Exam  Vitals and nursing note reviewed.   HENT:      Head: Normocephalic and atraumatic.      Mouth/Throat:      Mouth: Mucous membranes are moist.   Eyes:      Conjunctiva/sclera: Conjunctivae normal.   Cardiovascular:      Rate and Rhythm: Regular rhythm.   Pulmonary:      Effort: Pulmonary effort is normal.      Breath sounds: Normal breath sounds.   Abdominal:      General: Abdomen is flat.      Palpations: Abdomen is soft.   Musculoskeletal:      Cervical back: Normal range of motion and neck supple.   Lymphadenopathy:      Cervical: No cervical adenopathy.   Neurological:       Mental Status: He is alert.

## 2022-03-22 ASSESSMENT — ANXIETY QUESTIONNAIRES: GAD7 TOTAL SCORE: 4

## 2022-09-14 ENCOUNTER — OFFICE VISIT (OUTPATIENT)
Dept: FAMILY MEDICINE | Facility: CLINIC | Age: 57
End: 2022-09-14
Payer: COMMERCIAL

## 2022-09-14 VITALS
DIASTOLIC BLOOD PRESSURE: 78 MMHG | HEART RATE: 75 BPM | WEIGHT: 213 LBS | SYSTOLIC BLOOD PRESSURE: 126 MMHG | BODY MASS INDEX: 28.89 KG/M2

## 2022-09-14 DIAGNOSIS — I10 PRIMARY HYPERTENSION: ICD-10-CM

## 2022-09-14 DIAGNOSIS — E11.9 TYPE 2 DIABETES MELLITUS WITHOUT COMPLICATION, WITHOUT LONG-TERM CURRENT USE OF INSULIN (H): ICD-10-CM

## 2022-09-14 DIAGNOSIS — F41.1 GAD (GENERALIZED ANXIETY DISORDER): ICD-10-CM

## 2022-09-14 DIAGNOSIS — I10 BENIGN ESSENTIAL HYPERTENSION: ICD-10-CM

## 2022-09-14 PROBLEM — J30.9 ALLERGIC RHINITIS: Status: ACTIVE | Noted: 2022-09-14

## 2022-09-14 PROBLEM — G47.00 INSOMNIA: Status: ACTIVE | Noted: 2022-09-14

## 2022-09-14 PROBLEM — F32.A DEPRESSION: Status: ACTIVE | Noted: 2022-09-14

## 2022-09-14 PROBLEM — F10.10 ALCOHOL ABUSE: Status: ACTIVE | Noted: 2022-09-14

## 2022-09-14 PROBLEM — F33.1 MODERATE EPISODE OF RECURRENT MAJOR DEPRESSIVE DISORDER (H): Status: ACTIVE | Noted: 2022-09-14

## 2022-09-14 PROBLEM — E66.9 OBESITY: Status: ACTIVE | Noted: 2022-09-14

## 2022-09-14 LAB — HBA1C MFR BLD: 8.6 % (ref 0–5.6)

## 2022-09-14 PROCEDURE — 99207 PR FOOT EXAM NO CHARGE: CPT | Mod: 25 | Performed by: PHYSICIAN ASSISTANT

## 2022-09-14 PROCEDURE — 96127 BRIEF EMOTIONAL/BEHAV ASSMT: CPT | Performed by: PHYSICIAN ASSISTANT

## 2022-09-14 PROCEDURE — 83036 HEMOGLOBIN GLYCOSYLATED A1C: CPT | Performed by: PHYSICIAN ASSISTANT

## 2022-09-14 PROCEDURE — 99214 OFFICE O/P EST MOD 30 MIN: CPT | Performed by: PHYSICIAN ASSISTANT

## 2022-09-14 PROCEDURE — 36415 COLL VENOUS BLD VENIPUNCTURE: CPT | Performed by: PHYSICIAN ASSISTANT

## 2022-09-14 RX ORDER — AMLODIPINE BESYLATE 5 MG/1
5 TABLET ORAL DAILY
Qty: 90 TABLET | Refills: 1 | Status: SHIPPED | OUTPATIENT
Start: 2022-09-14 | End: 2023-03-10

## 2022-09-14 RX ORDER — LISINOPRIL AND HYDROCHLOROTHIAZIDE 20; 25 MG/1; MG/1
1 TABLET ORAL DAILY
Qty: 90 TABLET | Refills: 1 | Status: SHIPPED | OUTPATIENT
Start: 2022-09-14 | End: 2023-03-10

## 2022-09-14 RX ORDER — ESCITALOPRAM OXALATE 10 MG/1
10 TABLET ORAL DAILY
Qty: 90 TABLET | Refills: 1 | Status: SHIPPED | OUTPATIENT
Start: 2022-09-14 | End: 2023-03-10

## 2022-09-14 RX ORDER — METFORMIN HCL 500 MG
500 TABLET, EXTENDED RELEASE 24 HR ORAL 2 TIMES DAILY
Qty: 180 TABLET | Refills: 1 | Status: SHIPPED | OUTPATIENT
Start: 2022-09-14 | End: 2023-03-10

## 2022-09-14 ASSESSMENT — ANXIETY QUESTIONNAIRES
4. TROUBLE RELAXING: NOT AT ALL
7. FEELING AFRAID AS IF SOMETHING AWFUL MIGHT HAPPEN: NOT AT ALL
7. FEELING AFRAID AS IF SOMETHING AWFUL MIGHT HAPPEN: NOT AT ALL
2. NOT BEING ABLE TO STOP OR CONTROL WORRYING: NOT AT ALL
5. BEING SO RESTLESS THAT IT IS HARD TO SIT STILL: NOT AT ALL
GAD7 TOTAL SCORE: 0
6. BECOMING EASILY ANNOYED OR IRRITABLE: NOT AT ALL
3. WORRYING TOO MUCH ABOUT DIFFERENT THINGS: NOT AT ALL
GAD7 TOTAL SCORE: 0
GAD7 TOTAL SCORE: 0
1. FEELING NERVOUS, ANXIOUS, OR ON EDGE: NOT AT ALL

## 2022-09-14 ASSESSMENT — ENCOUNTER SYMPTOMS
NUMBNESS: 1
GASTROINTESTINAL NEGATIVE: 1
CONSTITUTIONAL NEGATIVE: 1
CARDIOVASCULAR NEGATIVE: 1
ENDOCRINE NEGATIVE: 1
RESPIRATORY NEGATIVE: 1

## 2022-09-14 NOTE — LETTER
September 14, 2022      Corwin Vasquez  [address] [place] [id number]        Dear ,    We are writing to inform you of your test results.    I would increase your metformin to 3 tabs a day. Take two with your largest meal and one about 12 hours apart from that. We should recheck the a1c in 3 months. Your results are more elevated than I would like.    Resulted Orders   Hemoglobin A1c   Result Value Ref Range    Hemoglobin A1C 8.6 (H) 0.0 - 5.6 %      Comment:      Normal <5.7%   Prediabetes 5.7-6.4%    Diabetes 6.5% or higher     Note: Adopted from ADA consensus guidelines.       If you have any questions or concerns, please call the clinic at the number listed above.       Sincerely,      CHANCE Graf

## 2022-09-14 NOTE — PROGRESS NOTES
Assessment & Plan     Type 2 diabetes mellitus without complication, without long-term current use of insulin (H)  Check A1c today  - Hemoglobin A1c  - metFORMIN (GLUCOPHAGE XR) 500 MG 24 hr tablet  Dispense: 180 tablet; Refill: 1  - Hemoglobin A1c  - FOOT EXAM    LUPE (generalized anxiety disorder)  Trolled  - escitalopram (LEXAPRO) 10 MG tablet  Dispense: 90 tablet; Refill: 1    Primary hypertension  Control  - Hemoglobin A1c  - amLODIPine (NORVASC) 5 MG tablet  Dispense: 90 tablet; Refill: 1  - Hemoglobin A1c    Benign essential hypertension  Controlled return to clinic in 6 months  - Hemoglobin A1c  - lisinopril-hydrochlorothiazide (ZESTORETIC) 20-25 MG tablet  Dispense: 90 tablet; Refill: 1  - Hemoglobin A1c               BMI:   Estimated body mass index is 28.89 kg/m  as calculated from the following:    Height as of 3/21/22: 1.829 m (6').    Weight as of this encounter: 96.6 kg (213 lb).           No follow-ups on file.    CHANCE Wolf  [facility]    Luis Olivia is a 56 year old, presenting for the following health issues:  Medication Refill      56-year-old here for chronic disease check he has history of hypertension type 2 diabetes generalized anxiety disorder he states things are going well at the present time the only concern he has is he has slight numbness sensation to the second and third digit of the left foot not all the time does not really feel like any burning or tingling at nighttime he just would like to have this checked if anything his feet tend to be cold but do warm up after he is in bed for a while    Medication Refill  Associated symptoms include numbness.   History of Present Illness       Mental Health Follow-up:  Patient presents to follow-up on Anxiety.    Patient's anxiety since last visit has been:  Better  The patient is not having other symptoms associated with anxiety.  Any significant life events: No  Patient is not feeling anxious  or having panic attacks.  Patient has no concerns about alcohol or drug use.    Diabetes:   He presents for follow up of diabetes.  He is checking home blood glucose a few times a month. He checks blood glucose before meals.  Blood glucose is never over 200 and never under 70. He is aware of hypoglycemia symptoms including shakiness. He is concerned about other.  He is having numbness in feet. The patient has not had a diabetic eye exam in the last 12 months.         Hypertension: He presents for follow up of hypertension.  He does check blood pressure  regularly outside of the clinic. Outpatient blood pressures have not been over 140/90. He does not follow a low salt diet.    Today's LUPE-7 Score: 0             Review of Systems   Constitutional: Negative.    Respiratory: Negative.    Cardiovascular: Negative.    Gastrointestinal: Negative.    Endocrine: Negative.    Skin: Negative.    Neurological: Positive for numbness.            Objective    /78 (BP Location: Right arm, Cuff Size: Adult Regular)   Pulse 75   Wt 96.6 kg (213 lb)   BMI 28.89 kg/m    Body mass index is 28.89 kg/m .  Physical Exam  Constitutional:       Appearance: Normal appearance.   HENT:      Head: Atraumatic.      Right Ear: Tympanic membrane normal.      Left Ear: Tympanic membrane normal.      Nose: Nose normal.      Mouth/Throat:      Mouth: Mucous membranes are moist.   Eyes:      Conjunctiva/sclera: Conjunctivae normal.   Cardiovascular:      Rate and Rhythm: Normal rate and regular rhythm.      Heart sounds: Normal heart sounds.   Pulmonary:      Effort: Pulmonary effort is normal.      Breath sounds: Normal breath sounds.   Abdominal:      Palpations: Abdomen is soft. There is no mass.      Tenderness: There is no abdominal tenderness. There is no guarding.   Musculoskeletal:         General: No swelling. Normal range of motion.      Cervical back: Neck supple.      Right lower leg: No edema.      Left lower leg: No edema.    Lymphadenopathy:      Cervical: No cervical adenopathy.   Skin:     General: Skin is dry.      Findings: No rash.   Neurological:      Mental Status: He is alert.      Comments: Monofilament 5/5 to the feet bilaterally

## 2023-03-10 ENCOUNTER — OFFICE VISIT (OUTPATIENT)
Dept: FAMILY MEDICINE | Facility: CLINIC | Age: 58
End: 2023-03-10
Payer: COMMERCIAL

## 2023-03-10 VITALS
TEMPERATURE: 97.9 F | HEART RATE: 74 BPM | RESPIRATION RATE: 18 BRPM | HEIGHT: 73 IN | BODY MASS INDEX: 28.1 KG/M2 | OXYGEN SATURATION: 97 % | SYSTOLIC BLOOD PRESSURE: 132 MMHG | WEIGHT: 212 LBS | DIASTOLIC BLOOD PRESSURE: 86 MMHG

## 2023-03-10 DIAGNOSIS — F41.1 GAD (GENERALIZED ANXIETY DISORDER): ICD-10-CM

## 2023-03-10 DIAGNOSIS — Z11.4 SCREENING FOR HIV (HUMAN IMMUNODEFICIENCY VIRUS): ICD-10-CM

## 2023-03-10 DIAGNOSIS — I10 ESSENTIAL HYPERTENSION: ICD-10-CM

## 2023-03-10 DIAGNOSIS — I10 PRIMARY HYPERTENSION: ICD-10-CM

## 2023-03-10 DIAGNOSIS — Z11.59 NEED FOR HEPATITIS C SCREENING TEST: ICD-10-CM

## 2023-03-10 DIAGNOSIS — Z13.220 SCREENING FOR HYPERLIPIDEMIA: ICD-10-CM

## 2023-03-10 DIAGNOSIS — Z12.11 SCREEN FOR COLON CANCER: ICD-10-CM

## 2023-03-10 DIAGNOSIS — E11.9 TYPE 2 DIABETES MELLITUS WITHOUT COMPLICATION, WITHOUT LONG-TERM CURRENT USE OF INSULIN (H): Primary | ICD-10-CM

## 2023-03-10 DIAGNOSIS — I10 BENIGN ESSENTIAL HYPERTENSION: ICD-10-CM

## 2023-03-10 LAB
ANION GAP SERPL CALCULATED.3IONS-SCNC: 13 MMOL/L (ref 7–15)
BUN SERPL-MCNC: 16.5 MG/DL (ref 6–20)
CALCIUM SERPL-MCNC: 9.9 MG/DL (ref 8.6–10)
CHLORIDE SERPL-SCNC: 102 MMOL/L (ref 98–107)
CHOLEST SERPL-MCNC: 184 MG/DL
CREAT SERPL-MCNC: 0.99 MG/DL (ref 0.67–1.17)
CREAT UR-MCNC: 173 MG/DL
DEPRECATED HCO3 PLAS-SCNC: 23 MMOL/L (ref 22–29)
GFR SERPL CREATININE-BSD FRML MDRD: 89 ML/MIN/1.73M2
GLUCOSE SERPL-MCNC: 154 MG/DL (ref 70–99)
HBA1C MFR BLD: 8.6 % (ref 0–5.6)
HDLC SERPL-MCNC: 27 MG/DL
LDLC SERPL CALC-MCNC: 137 MG/DL
MICROALBUMIN UR-MCNC: <12 MG/L
MICROALBUMIN/CREAT UR: NORMAL MG/G{CREAT}
NONHDLC SERPL-MCNC: 157 MG/DL
POTASSIUM SERPL-SCNC: 4 MMOL/L (ref 3.4–5.3)
SODIUM SERPL-SCNC: 138 MMOL/L (ref 136–145)
TRIGL SERPL-MCNC: 99 MG/DL

## 2023-03-10 PROCEDURE — 36415 COLL VENOUS BLD VENIPUNCTURE: CPT | Performed by: PHYSICIAN ASSISTANT

## 2023-03-10 PROCEDURE — 99214 OFFICE O/P EST MOD 30 MIN: CPT | Performed by: PHYSICIAN ASSISTANT

## 2023-03-10 PROCEDURE — 80048 BASIC METABOLIC PNL TOTAL CA: CPT | Performed by: PHYSICIAN ASSISTANT

## 2023-03-10 PROCEDURE — 82043 UR ALBUMIN QUANTITATIVE: CPT | Performed by: PHYSICIAN ASSISTANT

## 2023-03-10 PROCEDURE — 82570 ASSAY OF URINE CREATININE: CPT | Performed by: PHYSICIAN ASSISTANT

## 2023-03-10 PROCEDURE — 80061 LIPID PANEL: CPT | Performed by: PHYSICIAN ASSISTANT

## 2023-03-10 PROCEDURE — 83036 HEMOGLOBIN GLYCOSYLATED A1C: CPT | Performed by: PHYSICIAN ASSISTANT

## 2023-03-10 RX ORDER — METFORMIN HCL 500 MG
500 TABLET, EXTENDED RELEASE 24 HR ORAL 2 TIMES DAILY
Qty: 180 TABLET | Refills: 3 | Status: SHIPPED | OUTPATIENT
Start: 2023-03-10 | End: 2023-12-13

## 2023-03-10 RX ORDER — AMLODIPINE BESYLATE 5 MG/1
5 TABLET ORAL DAILY
Qty: 90 TABLET | Refills: 3 | Status: SHIPPED | OUTPATIENT
Start: 2023-03-10 | End: 2024-01-02

## 2023-03-10 RX ORDER — LISINOPRIL AND HYDROCHLOROTHIAZIDE 20; 25 MG/1; MG/1
1 TABLET ORAL DAILY
Qty: 90 TABLET | Refills: 3 | Status: SHIPPED | OUTPATIENT
Start: 2023-03-10 | End: 2024-01-02

## 2023-03-10 RX ORDER — ESCITALOPRAM OXALATE 10 MG/1
10 TABLET ORAL DAILY
Qty: 90 TABLET | Refills: 3 | Status: SHIPPED | OUTPATIENT
Start: 2023-03-10 | End: 2024-01-02

## 2023-03-10 ASSESSMENT — ANXIETY QUESTIONNAIRES
6. BECOMING EASILY ANNOYED OR IRRITABLE: NOT AT ALL
3. WORRYING TOO MUCH ABOUT DIFFERENT THINGS: NOT AT ALL
8. IF YOU CHECKED OFF ANY PROBLEMS, HOW DIFFICULT HAVE THESE MADE IT FOR YOU TO DO YOUR WORK, TAKE CARE OF THINGS AT HOME, OR GET ALONG WITH OTHER PEOPLE?: NOT DIFFICULT AT ALL
1. FEELING NERVOUS, ANXIOUS, OR ON EDGE: NOT AT ALL
GAD7 TOTAL SCORE: 0
4. TROUBLE RELAXING: NOT AT ALL
IF YOU CHECKED OFF ANY PROBLEMS ON THIS QUESTIONNAIRE, HOW DIFFICULT HAVE THESE PROBLEMS MADE IT FOR YOU TO DO YOUR WORK, TAKE CARE OF THINGS AT HOME, OR GET ALONG WITH OTHER PEOPLE: NOT DIFFICULT AT ALL
7. FEELING AFRAID AS IF SOMETHING AWFUL MIGHT HAPPEN: NOT AT ALL
5. BEING SO RESTLESS THAT IT IS HARD TO SIT STILL: NOT AT ALL
GAD7 TOTAL SCORE: 0
7. FEELING AFRAID AS IF SOMETHING AWFUL MIGHT HAPPEN: NOT AT ALL
2. NOT BEING ABLE TO STOP OR CONTROL WORRYING: NOT AT ALL
GAD7 TOTAL SCORE: 0

## 2023-03-10 ASSESSMENT — ENCOUNTER SYMPTOMS
CARDIOVASCULAR NEGATIVE: 1
WEAKNESS: 0
CONSTITUTIONAL NEGATIVE: 1
ENDOCRINE NEGATIVE: 1
GASTROINTESTINAL NEGATIVE: 1
PARESTHESIAS: 0
RESPIRATORY NEGATIVE: 1
NERVOUS/ANXIOUS: 1
NUMBNESS: 1

## 2023-03-10 ASSESSMENT — PATIENT HEALTH QUESTIONNAIRE - PHQ9
10. IF YOU CHECKED OFF ANY PROBLEMS, HOW DIFFICULT HAVE THESE PROBLEMS MADE IT FOR YOU TO DO YOUR WORK, TAKE CARE OF THINGS AT HOME, OR GET ALONG WITH OTHER PEOPLE: NOT DIFFICULT AT ALL
SUM OF ALL RESPONSES TO PHQ QUESTIONS 1-9: 1
SUM OF ALL RESPONSES TO PHQ QUESTIONS 1-9: 1

## 2023-03-10 NOTE — PROGRESS NOTES
"  Assessment & Plan     Screen for colon cancer  He declines    Screening for HIV (human immunodeficiency virus)  He declines    Need for hepatitis C screening test  He declines   - HEMOGLOBIN A1C  - Albumin Random Urine Quantitative with Creat Ratio    Screening for hyperlipidemia  Labs pending  - HEMOGLOBIN A1C  - BASIC METABOLIC PANEL  - Lipid panel reflex to direct LDL Non-fasting  - Albumin Random Urine Quantitative with Creat Ratio    Type 2 diabetes mellitus without complication, without long-term current use of insulin (H)  Labs pending  - HEMOGLOBIN A1C  - BASIC METABOLIC PANEL  - Albumin Random Urine Quantitative with Creat Ratio  - metFORMIN (GLUCOPHAGE XR) 500 MG 24 hr tablet  Dispense: 180 tablet; Refill: 3    Essential hypertension  Controlled  - HEMOGLOBIN A1C  - BASIC METABOLIC PANEL  - Albumin Random Urine Quantitative with Creat Ratio    LUPE (generalized anxiety disorder)  Stable  - BASIC METABOLIC PANEL  - escitalopram (LEXAPRO) 10 MG tablet  Dispense: 90 tablet; Refill: 3    Benign essential hypertension  Controlled  - HEMOGLOBIN A1C  - BASIC METABOLIC PANEL  - Albumin Random Urine Quantitative with Creat Ratio  - lisinopril-hydrochlorothiazide (ZESTORETIC) 20-25 MG tablet  Dispense: 90 tablet; Refill: 3    Primary hypertension  Controlled return in 6 months and as needed  - HEMOGLOBIN A1C  - BASIC METABOLIC PANEL  - Albumin Random Urine Quantitative with Creat Ratio  - amLODIPine (NORVASC) 5 MG tablet  Dispense: 90 tablet; Refill: 3               BMI:   Estimated body mass index is 28.36 kg/m  as calculated from the following:    Height as of this encounter: 1.842 m (6' 0.5\").    Weight as of this encounter: 96.2 kg (212 lb).           No follow-ups on file.    CHANCE Wolf  [facility]    Luis Olivia is a 57 year old, presenting for the following health issues:  Hypertension, Depression, Diabetes, and Anxiety      57-year-old male history of type 2 " diabetes hypertension generalized anxiety presents for medication renewal  He still does not sleep for a while but it does not affect him the next day i.e. no grogginess or feeling as though his rundown  No homicidal or suicidal ideation  No chest pain no shortness of breath    Anxiety  Associated symptoms include numbness. Pertinent negatives include no weakness.   History of Present Illness       Mental Health Follow-up:  Patient presents to follow-up on Anxiety.    Patient's anxiety since last visit has been:  Better  The patient is not having other symptoms associated with anxiety.  Any significant life events: No  Patient is not feeling anxious or having panic attacks.  Patient has no concerns about alcohol or drug use.    Diabetes:   He presents for follow up of diabetes.  He is checking home blood glucose a few times a week. He checks blood glucose before meals.  Blood glucose is never over 200 and never under 70. He is aware of hypoglycemia symptoms including weakness. He is concerned about other.  He is having numbness in feet. The patient has not had a diabetic eye exam in the last 12 months.         Hypertension: He presents for follow up of hypertension.  He does not check blood pressure  regularly outside of the clinic. Outpatient blood pressures have not been over 140/90. He does not follow a low salt diet.     He eats 2-3 servings of fruits and vegetables daily.He consumes 1 sweetened beverage(s) daily.He exercises with enough effort to increase his heart rate 9 or less minutes per day.    He is taking medications regularly.    Today's PHQ-9         PHQ-9 Total Score: 1    PHQ-9 Q9 Thoughts of better off dead/self-harm past 2 weeks :   Not at all    How difficult have these problems made it for you to do your work, take care of things at home, or get along with other people: Not difficult at all  Today's LUPE-7 Score: 0             Review of Systems   Constitutional: Negative.    Respiratory: Negative.  "   Cardiovascular: Negative.    Gastrointestinal: Negative.    Endocrine: Negative.    Neurological: Positive for numbness. Negative for weakness and paresthesias.   Psychiatric/Behavioral: Negative for self-injury and suicidal ideas. The patient is nervous/anxious.             Objective    /86   Pulse 74   Temp 97.9  F (36.6  C)   Resp 18   Ht 1.842 m (6' 0.5\")   Wt 96.2 kg (212 lb)   SpO2 97%   BMI 28.36 kg/m    Body mass index is 28.36 kg/m .  Physical Exam  Vitals and nursing note reviewed.   Constitutional:       Appearance: Normal appearance.   HENT:      Head: Normocephalic and atraumatic.      Right Ear: Tympanic membrane normal.      Left Ear: Tympanic membrane normal.      Nose: Nose normal. No congestion or rhinorrhea.      Mouth/Throat:      Mouth: Mucous membranes are moist.   Eyes:      Conjunctiva/sclera: Conjunctivae normal.   Cardiovascular:      Rate and Rhythm: Normal rate and regular rhythm.      Heart sounds: Normal heart sounds.   Pulmonary:      Effort: Pulmonary effort is normal.      Breath sounds: Normal breath sounds.   Abdominal:      General: Abdomen is flat. Bowel sounds are normal.      Palpations: There is no mass.      Tenderness: There is no abdominal tenderness.   Musculoskeletal:         General: Normal range of motion.      Cervical back: Normal range of motion and neck supple.      Right lower leg: No edema.      Left lower leg: No edema.   Lymphadenopathy:      Cervical: No cervical adenopathy.   Skin:     General: Skin is warm and dry.   Neurological:      General: No focal deficit present.      Comments: Monofilament 5/5 to the feet bilaterally   Psychiatric:         Mood and Affect: Mood normal.         Behavior: Behavior normal.         Thought Content: Thought content normal.         Judgment: Judgment normal.                            "

## 2023-03-10 NOTE — LETTER
March 10, 2023      Corwin Vasquez  [address] [facility] [address]        Dear ,    We are writing to inform you of your test results.    It was good to see you today.  Your lipids are more elevated than I would like, especially your LDL.  If you wish to consider a statin please call me and we can do that.  Your hemoglobin A1c is showing that your diabetes is not under ideal control.  I would like you to increase your metformin to 2 pills twice a day.  You can use your current supply and I understand you will need a refill sooner this way.  And we should get an A1c in 3 months to see if we have made positive improvement.  Please call me if you have any questions    Resulted Orders   HEMOGLOBIN A1C   Result Value Ref Range    Hemoglobin A1C 8.6 (H) 0.0 - 5.6 %   BASIC METABOLIC PANEL   Result Value Ref Range    Sodium 138 136 - 145 mmol/L    Potassium 4.0 3.4 - 5.3 mmol/L    Chloride 102 98 - 107 mmol/L    Carbon Dioxide (CO2) 23 22 - 29 mmol/L    Anion Gap 13 7 - 15 mmol/L    Urea Nitrogen 16.5 6.0 - 20.0 mg/dL    Creatinine 0.99 0.67 - 1.17 mg/dL    Calcium 9.9 8.6 - 10.0 mg/dL    Glucose 154 (H) 70 - 99 mg/dL    GFR Estimate 89 >60 mL/min/1.73m2      Comment:      eGFR calculated using 2021 CKD-EPI equation.   Lipid panel reflex to direct LDL Non-fasting   Result Value Ref Range    Cholesterol 184 <200 mg/dL    Triglycerides 99 <150 mg/dL    Direct Measure HDL 27 (L) >=40 mg/dL    LDL Cholesterol Calculated 137 (H) <=100 mg/dL    Non HDL Cholesterol 157 (H) <130 mg/dL    Narrative    Cholesterol  Desirable:  <200 mg/dL    Triglycerides  Normal:  Less than 150 mg/dL  Borderline High:  150-199 mg/dL  High:  200-499 mg/dL  Very High:  Greater than or equal to 500 mg/dL    Direct Measure HDL  Female:  Greater than or equal to 50 mg/dL   Male:  Greater than or equal to 40 mg/dL    LDL Cholesterol  Desirable:  <100mg/dL  Above Desirable:  100-129 mg/dL   Borderline High:  130-159 mg/dL   High:  160-189  mg/dL   Very High:  >= 190 mg/dL    Non HDL Cholesterol  Desirable:  130 mg/dL  Above Desirable:  130-159 mg/dL  Borderline High:  160-189 mg/dL  High:  190-219 mg/dL  Very High:  Greater than or equal to 220 mg/dL   Albumin Random Urine Quantitative with Creat Ratio   Result Value Ref Range    Creatinine Urine mg/dL 173.0 mg/dL      Comment:      The reference ranges have not been established in urine creatinine. The results should be integrated into the clinical context for interpretation.    Albumin Urine mg/L <12.0 mg/L      Comment:      The reference ranges have not been established in urine albumin. The results should be integrated into the clinical context for interpretation.    Albumin Urine mg/g Cr        Comment:      Unable to calculate, urine albumin and/or urine creatinine is outside detectable limits.  Microalbuminuria is defined as an albumin:creatinine ratio of 17 to 299 for males and 25 to 299 for females. A ratio of albumin:creatinine of 300 or higher is indicative of overt proteinuria.  Due to biologic variability, positive results should be confirmed by a second, first-morning random or 24-hour timed urine specimen. If there is discrepancy, a third specimen is recommended. When 2 out of 3 results are in the microalbuminuria range, this is evidence for incipient nephropathy and warrants increased efforts at glucose control, blood pressure control, and institution of therapy with an angiotensin-converting-enzyme (ACE) inhibitor (if the patient can tolerate it).         If you have any questions or concerns, please call the clinic at the number listed above.       Sincerely,      CHANCE Graf

## 2023-12-13 DIAGNOSIS — E11.9 TYPE 2 DIABETES MELLITUS WITHOUT COMPLICATION, WITHOUT LONG-TERM CURRENT USE OF INSULIN (H): ICD-10-CM

## 2023-12-13 NOTE — TELEPHONE ENCOUNTER
Medication Question or Refill    Contacts         Type Contact Phone/Fax    12/13/2023 03:49 PM CST Phone (Incoming) Pancho Vasquez (Self) [phone] (H)            What medication are you calling about (include dose and sig)?:   metFORMIN (GLUCOPHAGE XR) 500 MG 24 hr tablet     Preferred Pharmacy:   [facility] [address] [facility]  [address] [facility] [address]  Phone: [phone] Fax: [phone]      Controlled Substance Agreement on file:   CSA -- Patient Level:    CSA: None found at the patient level.       Who prescribed the medication?: PCP    Do you need a refill? Yes    When did you use the medication last? Today    Patient offered an appointment? Yes: Appt not in the timeframe needed for refill    Do you have any questions or concerns?  Yes: Pt called to schedule Appt with PCP for a med refill, however, there were no openings for In-Person or Virtual prior to 12/22 and Pt will run out of the above Rx by 12/19. He would like a refill sent to Pharmacy as soon as possible or a call to discuss other options for a sooner Appt. Thank you!      Okay to leave a detailed message?: Yes at Cell number on file:    Telephone Information:   Mobile [phone]

## 2023-12-14 RX ORDER — METFORMIN HCL 500 MG
500 TABLET, EXTENDED RELEASE 24 HR ORAL 2 TIMES DAILY
Qty: 180 TABLET | Refills: 0 | Status: SHIPPED | OUTPATIENT
Start: 2023-12-14 | End: 2024-01-02

## 2023-12-14 NOTE — TELEPHONE ENCOUNTER
Patient requesting Refill of Metformin.  (Should have refills on file).  Will send RX for patient convenience.

## 2024-01-02 ENCOUNTER — OFFICE VISIT (OUTPATIENT)
Dept: FAMILY MEDICINE | Facility: CLINIC | Age: 59
End: 2024-01-02
Payer: COMMERCIAL

## 2024-01-02 VITALS
OXYGEN SATURATION: 95 % | RESPIRATION RATE: 16 BRPM | WEIGHT: 204.1 LBS | TEMPERATURE: 97.8 F | SYSTOLIC BLOOD PRESSURE: 120 MMHG | DIASTOLIC BLOOD PRESSURE: 66 MMHG | HEART RATE: 96 BPM | BODY MASS INDEX: 27.05 KG/M2 | HEIGHT: 73 IN

## 2024-01-02 DIAGNOSIS — F41.1 GAD (GENERALIZED ANXIETY DISORDER): ICD-10-CM

## 2024-01-02 DIAGNOSIS — I10 BENIGN ESSENTIAL HYPERTENSION: ICD-10-CM

## 2024-01-02 DIAGNOSIS — E11.9 TYPE 2 DIABETES MELLITUS WITHOUT COMPLICATION, WITHOUT LONG-TERM CURRENT USE OF INSULIN (H): ICD-10-CM

## 2024-01-02 LAB — HBA1C MFR BLD: 7.4 % (ref 0–5.6)

## 2024-01-02 PROCEDURE — 36415 COLL VENOUS BLD VENIPUNCTURE: CPT | Performed by: PHYSICIAN ASSISTANT

## 2024-01-02 PROCEDURE — 99207 PR FOOT EXAM NO CHARGE: CPT | Mod: 25 | Performed by: PHYSICIAN ASSISTANT

## 2024-01-02 PROCEDURE — 83036 HEMOGLOBIN GLYCOSYLATED A1C: CPT | Performed by: PHYSICIAN ASSISTANT

## 2024-01-02 PROCEDURE — 99214 OFFICE O/P EST MOD 30 MIN: CPT | Performed by: PHYSICIAN ASSISTANT

## 2024-01-02 RX ORDER — LISINOPRIL AND HYDROCHLOROTHIAZIDE 20; 25 MG/1; MG/1
1 TABLET ORAL DAILY
Qty: 90 TABLET | Refills: 3 | Status: SHIPPED | OUTPATIENT
Start: 2024-01-02

## 2024-01-02 RX ORDER — ESCITALOPRAM OXALATE 10 MG/1
10 TABLET ORAL DAILY
Qty: 90 TABLET | Refills: 3 | Status: SHIPPED | OUTPATIENT
Start: 2024-01-02

## 2024-01-02 RX ORDER — AMLODIPINE BESYLATE 5 MG/1
5 TABLET ORAL DAILY
Qty: 90 TABLET | Refills: 3 | Status: SHIPPED | OUTPATIENT
Start: 2024-01-02

## 2024-01-02 RX ORDER — METFORMIN HCL 500 MG
500 TABLET, EXTENDED RELEASE 24 HR ORAL 2 TIMES DAILY
Qty: 180 TABLET | Refills: 1 | Status: SHIPPED | OUTPATIENT
Start: 2024-01-02 | End: 2024-07-11

## 2024-01-02 ASSESSMENT — ANXIETY QUESTIONNAIRES
7. FEELING AFRAID AS IF SOMETHING AWFUL MIGHT HAPPEN: NOT AT ALL
GAD7 TOTAL SCORE: 2
4. TROUBLE RELAXING: NOT AT ALL
2. NOT BEING ABLE TO STOP OR CONTROL WORRYING: NOT AT ALL
7. FEELING AFRAID AS IF SOMETHING AWFUL MIGHT HAPPEN: NOT AT ALL
GAD7 TOTAL SCORE: 2
3. WORRYING TOO MUCH ABOUT DIFFERENT THINGS: SEVERAL DAYS
5. BEING SO RESTLESS THAT IT IS HARD TO SIT STILL: NOT AT ALL
8. IF YOU CHECKED OFF ANY PROBLEMS, HOW DIFFICULT HAVE THESE MADE IT FOR YOU TO DO YOUR WORK, TAKE CARE OF THINGS AT HOME, OR GET ALONG WITH OTHER PEOPLE?: NOT DIFFICULT AT ALL
IF YOU CHECKED OFF ANY PROBLEMS ON THIS QUESTIONNAIRE, HOW DIFFICULT HAVE THESE PROBLEMS MADE IT FOR YOU TO DO YOUR WORK, TAKE CARE OF THINGS AT HOME, OR GET ALONG WITH OTHER PEOPLE: NOT DIFFICULT AT ALL
1. FEELING NERVOUS, ANXIOUS, OR ON EDGE: SEVERAL DAYS
GAD7 TOTAL SCORE: 2
6. BECOMING EASILY ANNOYED OR IRRITABLE: NOT AT ALL

## 2024-01-02 ASSESSMENT — ENCOUNTER SYMPTOMS
CARDIOVASCULAR NEGATIVE: 1
NUMBNESS: 0
CONSTITUTIONAL NEGATIVE: 1
GASTROINTESTINAL NEGATIVE: 1
ENDOCRINE NEGATIVE: 1

## 2024-01-02 NOTE — PROGRESS NOTES
"  Assessment & Plan     Type 2 diabetes mellitus without complication, without long-term current use of insulin (H)  A1c was not at goal we will recheck now he states his blood sugars have been running better if elevated will increase metformin  - metFORMIN (GLUCOPHAGE XR) 500 MG 24 hr tablet  Dispense: 180 tablet; Refill: 1  - Hemoglobin A1c  - Hemoglobin A1c  - FOOT EXAM    Benign essential hypertension  Controlled continue current medication  - lisinopril-hydrochlorothiazide (ZESTORETIC) 20-25 MG tablet  Dispense: 90 tablet; Refill: 3    LUPE (generalized anxiety disorder)  Controlled continue current medication  - escitalopram (LEXAPRO) 10 MG tablet  Dispense: 90 tablet; Refill: 3               BMI:   Estimated body mass index is 27.3 kg/m  as calculated from the following:    Height as of this encounter: 1.842 m (6' 0.5\").    Weight as of this encounter: 92.6 kg (204 lb 1.6 oz).           CHANCE Wolf  [facility]    Luis   Pancho is a 58 year old, presenting for the following health issues:  Hypertension, Diabetes, and  Follow Up (Depression/anxiety )        1/2/2024     7:58 AM   Additional Questions   Roomed by TK Nicholas       58-year-old presents clinic follow-up evaluation for diabetes and generalized anxiety disorder  He is doing well with no particular concerns    History of Present Illness       Mental Health Follow-up:  Patient presents to follow-up on Anxiety.    Patient's anxiety since last visit has been:  Better  The patient is not having other symptoms associated with anxiety.  Any significant life events: No  Patient is feeling anxious or having panic attacks.  Patient has no concerns about alcohol or drug use.    Diabetes:   He presents for follow up of diabetes.  He is checking home blood glucose a few times a month.   He checks blood glucose before meals.  Blood glucose is never over 200 and never under 70. He is aware of hypoglycemia symptoms " "including weakness.    He has no concerns regarding his diabetes at this time.  He is having numbness in feet.  The patient has not had a diabetic eye exam in the last 12 months.          Hypertension: He presents for follow up of hypertension.  He does check blood pressure  regularly outside of the clinic. Outpatient blood pressures have not been over 140/90. He does not follow a low salt diet.     He eats 0-1 servings of fruits and vegetables daily.He consumes 1 sweetened beverage(s) daily.He exercises with enough effort to increase his heart rate 9 or less minutes per day.  He exercises with enough effort to increase his heart rate 3 or less days per week.   He is taking medications regularly.                 Review of Systems   Constitutional: Negative.    Cardiovascular: Negative.    Gastrointestinal: Negative.    Endocrine: Negative.    Neurological:  Negative for numbness.            Objective    /66 (BP Location: Right arm, Patient Position: Sitting, Cuff Size: Adult Regular)   Pulse 96   Temp 97.8  F (36.6  C) (Tympanic)   Resp 16   Ht 1.842 m (6' 0.5\")   Wt 92.6 kg (204 lb 1.6 oz)   SpO2 95%   BMI 27.30 kg/m    Body mass index is 27.3 kg/m .  Physical Exam  Constitutional:       Appearance: Normal appearance.   HENT:      Head: Normocephalic and atraumatic.      Mouth/Throat:      Mouth: Mucous membranes are moist.      Pharynx: No posterior oropharyngeal erythema.   Cardiovascular:      Rate and Rhythm: Normal rate and regular rhythm.      Heart sounds: Normal heart sounds.   Pulmonary:      Effort: Pulmonary effort is normal.      Breath sounds: Normal breath sounds.   Abdominal:      Tenderness: There is no abdominal tenderness.      Hernia: No hernia is present.   Musculoskeletal:      Cervical back: Normal range of motion and neck supple.   Lymphadenopathy:      Cervical: No cervical adenopathy.   Skin:     General: Skin is warm and dry.      Findings: No erythema.      Comments: Multiple " abrasions about the right hand superficial none of them appear to be infected at this time   Neurological:      Mental Status: He is alert.      Comments: Monofilament 5/5 right 4/5 left

## 2024-01-02 NOTE — LETTER
January 2, 2024      Pancho Vasquez  [address] [place] [id number]        Dear ,    We are writing to inform you of your test results.    This is much improved and now in your target range.    Resulted Orders   Hemoglobin A1c   Result Value Ref Range    Hemoglobin A1C 7.4 (H) 0.0 - 5.6 %      Comment:      Normal <5.7%   Prediabetes 5.7-6.4%    Diabetes 6.5% or higher     Note: Adopted from ADA consensus guidelines.       If you have any questions or concerns, please call the clinic at the number listed above.       Sincerely,      CHANCE Graf

## 2024-08-16 ENCOUNTER — OFFICE VISIT (OUTPATIENT)
Dept: FAMILY MEDICINE | Facility: CLINIC | Age: 59
End: 2024-08-16
Payer: COMMERCIAL

## 2024-08-16 VITALS
HEART RATE: 69 BPM | TEMPERATURE: 98.2 F | SYSTOLIC BLOOD PRESSURE: 114 MMHG | HEIGHT: 73 IN | BODY MASS INDEX: 27.09 KG/M2 | DIASTOLIC BLOOD PRESSURE: 76 MMHG | OXYGEN SATURATION: 99 % | WEIGHT: 204.4 LBS | RESPIRATION RATE: 16 BRPM

## 2024-08-16 DIAGNOSIS — E11.9 TYPE 2 DIABETES MELLITUS WITHOUT COMPLICATION, WITHOUT LONG-TERM CURRENT USE OF INSULIN (H): Primary | ICD-10-CM

## 2024-08-16 DIAGNOSIS — I10 ESSENTIAL HYPERTENSION: ICD-10-CM

## 2024-08-16 LAB
ANION GAP SERPL CALCULATED.3IONS-SCNC: 12 MMOL/L (ref 7–15)
BUN SERPL-MCNC: 16.6 MG/DL (ref 6–20)
CALCIUM SERPL-MCNC: 9.7 MG/DL (ref 8.8–10.4)
CHLORIDE SERPL-SCNC: 101 MMOL/L (ref 98–107)
CHOLEST SERPL-MCNC: 182 MG/DL
CREAT SERPL-MCNC: 1.02 MG/DL (ref 0.67–1.17)
CREAT UR-MCNC: 122 MG/DL
EGFRCR SERPLBLD CKD-EPI 2021: 86 ML/MIN/1.73M2
FASTING STATUS PATIENT QL REPORTED: NO
FASTING STATUS PATIENT QL REPORTED: NO
GLUCOSE SERPL-MCNC: 86 MG/DL (ref 70–99)
HBA1C MFR BLD: 6.7 % (ref 0–5.6)
HCO3 SERPL-SCNC: 26 MMOL/L (ref 22–29)
HDLC SERPL-MCNC: 31 MG/DL
LDLC SERPL CALC-MCNC: 111 MG/DL
MICROALBUMIN UR-MCNC: <12 MG/L
MICROALBUMIN/CREAT UR: NORMAL MG/G{CREAT}
NONHDLC SERPL-MCNC: 151 MG/DL
POTASSIUM SERPL-SCNC: 4 MMOL/L (ref 3.4–5.3)
SODIUM SERPL-SCNC: 139 MMOL/L (ref 135–145)
TRIGL SERPL-MCNC: 199 MG/DL

## 2024-08-16 PROCEDURE — G2211 COMPLEX E/M VISIT ADD ON: HCPCS | Performed by: PHYSICIAN ASSISTANT

## 2024-08-16 PROCEDURE — 99214 OFFICE O/P EST MOD 30 MIN: CPT | Performed by: PHYSICIAN ASSISTANT

## 2024-08-16 PROCEDURE — 80061 LIPID PANEL: CPT | Performed by: PHYSICIAN ASSISTANT

## 2024-08-16 PROCEDURE — 82570 ASSAY OF URINE CREATININE: CPT | Performed by: PHYSICIAN ASSISTANT

## 2024-08-16 PROCEDURE — 82043 UR ALBUMIN QUANTITATIVE: CPT | Performed by: PHYSICIAN ASSISTANT

## 2024-08-16 PROCEDURE — 36415 COLL VENOUS BLD VENIPUNCTURE: CPT | Performed by: PHYSICIAN ASSISTANT

## 2024-08-16 PROCEDURE — 99207 PR FOOT EXAM NO CHARGE: CPT | Performed by: PHYSICIAN ASSISTANT

## 2024-08-16 PROCEDURE — 83036 HEMOGLOBIN GLYCOSYLATED A1C: CPT | Performed by: PHYSICIAN ASSISTANT

## 2024-08-16 PROCEDURE — 80048 BASIC METABOLIC PNL TOTAL CA: CPT | Performed by: PHYSICIAN ASSISTANT

## 2024-08-16 RX ORDER — METFORMIN HCL 500 MG
500 TABLET, EXTENDED RELEASE 24 HR ORAL 3 TIMES DAILY
Qty: 270 TABLET | Refills: 1 | Status: SHIPPED | OUTPATIENT
Start: 2024-08-16

## 2024-08-16 RX ORDER — METFORMIN HCL 500 MG
500 TABLET, EXTENDED RELEASE 24 HR ORAL 2 TIMES DAILY WITH MEALS
Qty: 180 TABLET | Refills: 0 | Status: CANCELLED | OUTPATIENT
Start: 2024-08-16

## 2024-08-16 NOTE — LETTER
August 19, 2024      Pancho Vasquez  [address] [place] [id number]        Dear ,    We are writing to inform you of your test results.    Your A1c is under excellent control.  Your chemistries are normal.  Your LDL cholesterol remains mildly elevated.    Resulted Orders   BASIC METABOLIC PANEL   Result Value Ref Range    Sodium 139 135 - 145 mmol/L    Potassium 4.0 3.4 - 5.3 mmol/L    Chloride 101 98 - 107 mmol/L    Carbon Dioxide (CO2) 26 22 - 29 mmol/L    Anion Gap 12 7 - 15 mmol/L    Urea Nitrogen 16.6 6.0 - 20.0 mg/dL    Creatinine 1.02 0.67 - 1.17 mg/dL    GFR Estimate 86 >60 mL/min/1.73m2      Comment:      eGFR calculated using 2021 CKD-EPI equation.    Calcium 9.7 8.8 - 10.4 mg/dL      Comment:      Reference intervals for this test were updated on 7/16/2024 to reflect our healthy population more accurately. There may be differences in the flagging of prior results with similar values performed with this method. Those prior results can be interpreted in the context of the updated reference intervals.    Glucose 86 70 - 99 mg/dL    Patient Fasting > 8hrs? No    Lipid panel reflex to direct LDL Non-fasting   Result Value Ref Range    Cholesterol 182 <200 mg/dL    Triglycerides 199 (H) <150 mg/dL    Direct Measure HDL 31 (L) >=40 mg/dL    LDL Cholesterol Calculated 111 (H) <=100 mg/dL    Non HDL Cholesterol 151 (H) <130 mg/dL    Patient Fasting > 8hrs? No     Narrative    Cholesterol  Desirable:  <200 mg/dL    Triglycerides  Normal:  Less than 150 mg/dL  Borderline High:  150-199 mg/dL  High:  200-499 mg/dL  Very High:  Greater than or equal to 500 mg/dL    Direct Measure HDL  Female:  Greater than or equal to 50 mg/dL   Male:  Greater than or equal to 40 mg/dL    LDL Cholesterol  Desirable:  <100mg/dL  Above Desirable:  100-129 mg/dL   Borderline High:  130-159 mg/dL   High:  160-189 mg/dL   Very High:  >= 190 mg/dL    Non HDL Cholesterol  Desirable:  130 mg/dL  Above Desirable:  130-159  mg/dL  Borderline High:  160-189 mg/dL  High:  190-219 mg/dL  Very High:  Greater than or equal to 220 mg/dL   Albumin Random Urine Quantitative with Creat Ratio   Result Value Ref Range    Creatinine Urine mg/dL 122.0 mg/dL      Comment:      The reference ranges have not been established in urine creatinine. The results should be integrated into the clinical context for interpretation.    Albumin Urine mg/L <12.0 mg/L      Comment:      The reference ranges have not been established in urine albumin. The results should be integrated into the clinical context for interpretation.    Albumin Urine mg/g Cr        Comment:      Unable to calculate, urine albumin and/or urine creatinine is outside detectable limits.  Microalbuminuria is defined as an albumin:creatinine ratio of 17 to 299 for males and 25 to 299 for females. A ratio of albumin:creatinine of 300 or higher is indicative of overt proteinuria.  Due to biologic variability, positive results should be confirmed by a second, first-morning random or 24-hour timed urine specimen. If there is discrepancy, a third specimen is recommended. When 2 out of 3 results are in the microalbuminuria range, this is evidence for incipient nephropathy and warrants increased efforts at glucose control, blood pressure control, and institution of therapy with an angiotensin-converting-enzyme (ACE) inhibitor (if the patient can tolerate it).     HEMOGLOBIN A1C   Result Value Ref Range    Hemoglobin A1C 6.7 (H) 0.0 - 5.6 %      Comment:      Normal <5.7%   Prediabetes 5.7-6.4%    Diabetes 6.5% or higher     Note: Adopted from ADA consensus guidelines.       If you have any questions or concerns, please call the clinic at the number listed above.       Sincerely,      CHANCE Graf

## 2024-08-16 NOTE — PROGRESS NOTES
"  Assessment & Plan     (E11.9) Type 2 diabetes mellitus without complication, without long-term current use of insulin (H)  (primary encounter diagnosis)  Comment: Normal control  Plan: BASIC METABOLIC PANEL, Lipid panel reflex to         direct LDL Non-fasting, Albumin Random Urine         Quantitative with Creat Ratio, HEMOGLOBIN A1C,         metFORMIN (GLUCOPHAGE XR) 500 MG 24 hr tablet,         FOOT EXAM        Labs pending continue current medication    (I10) Essential hypertension  Comment: Controlled  Plan: BASIC METABOLIC PANEL, Lipid panel reflex to         direct LDL Non-fasting, Albumin Random Urine         Quantitative with Creat Ratio, HEMOGLOBIN A1C        Labs pending continue current medication          BMI  Estimated body mass index is 27.34 kg/m  as calculated from the following:    Height as of this encounter: 1.842 m (6' 0.5\").    Weight as of this encounter: 92.7 kg (204 lb 6.4 oz).             Luis Deleon is a 58 year old, presenting for the following health issues: patient is here today for a med check/refills  Recheck Medication        8/16/2024    12:06 PM   Additional Questions   Roomed by kanwal pope   Accompanied by self     58-year-old here for hypertension and diabetes  He is doing well  No complaints or concerns  He declines hepatitis C or HIV  He is due for an eye exam and will schedule that here in the near future and send us a report  He refuses colon cancer screening    History of Present Illness       Reason for visit:  Annual check    He eats 2-3 servings of fruits and vegetables daily.He consumes 1 sweetened beverage(s) daily.He exercises with enough effort to increase his heart rate 9 or less minutes per day.  He exercises with enough effort to increase his heart rate 4 days per week.   He is taking medications regularly.     Diabetes Follow-up    How often are you checking your blood sugar? A few times a month  What time of day are you checking your blood sugars (select all " that apply)?   Random times - varies  Have you had any blood sugars above 200?  No  Have you had any blood sugars below 70?  No  What symptoms do you notice when your blood sugar is low?  Weak  What concerns do you have today about your diabetes? None   Do you have any of these symptoms? (Select all that apply)  Numbness in feet  Have you had a diabetic eye exam in the last 12 months? No    PATIENT HEALTH QUESTIONNAIRE-9 (PHQ - 9)    Over the last 2 weeks, how often have you been bothered by any of the following problems?    1. Little interest or pleasure in doing things -  Not at all   2. Feeling down, depressed, or hopeless -  Not at all   3. Trouble falling or staying asleep, or sleeping too much - Several days   4. Feeling tired or having little energy -  Not at all   5. Poor appetite or overeating -  Not at all   6. Feeling bad about yourself - or that you are a failure or have let yourself or your family down -  Not at all   7. Trouble concentrating on things, such as reading the newspaper or watching television - Not at all   8. Moving or speaking so slowly that other people could have noticed? Or the opposite - being so fidgety or restless that you have been moving around a lot more than usual Not at all   9. Thoughts that you would be better off dead or of hurting  yourself in some way Not at all   Total Score: 1     If you checked off any problems, how difficult have these problems made it for you to do your work, take care of things at home, or get along with other people?      Developed by [employer], Debra Santillan, [employer] and colleagues, with an educational pierce from [employer] No permission required to reproduce, translate, display or distribute. permission required to reproduce, translate, display or distribute.     BP Readings from Last 2 Encounters:   08/16/24 114/76   01/02/24 120/66     Hemoglobin A1C (%)   Date Value   01/02/2024 7.4 (H)   03/10/2023 8.6 (H)     LDL  "Cholesterol Calculated (mg/dL)   Date Value   03/10/2023 137 (H)   03/21/2022 113 (H)             Hypertension Follow-up    Do you check your blood pressure regularly outside of the clinic? No   Are you following a low salt diet? Yes  Are your blood pressures ever more than 140 on the top number (systolic) OR more   than 90 on the bottom number (diastolic), for example 140/90? No              Objective    /76 (BP Location: Right arm, Patient Position: Sitting)   Pulse 69   Temp 98.2  F (36.8  C)   Resp 16   Ht 1.842 m (6' 0.5\")   Wt 92.7 kg (204 lb 6.4 oz)   SpO2 99%   BMI 27.34 kg/m    Body mass index is 27.34 kg/m .  Physical Exam  Vitals and nursing note reviewed.   Constitutional:       Appearance: Normal appearance.   HENT:      Head: Normocephalic and atraumatic.      Right Ear: Tympanic membrane normal.      Left Ear: Tympanic membrane normal.      Nose: Nose normal. No congestion or rhinorrhea.      Mouth/Throat:      Mouth: Mucous membranes are moist.   Eyes:      Conjunctiva/sclera: Conjunctivae normal.   Cardiovascular:      Rate and Rhythm: Normal rate and regular rhythm.      Heart sounds: Normal heart sounds.   Pulmonary:      Effort: Pulmonary effort is normal.      Breath sounds: Normal breath sounds.   Abdominal:      General: Abdomen is flat. Bowel sounds are normal.      Palpations: There is no mass.      Tenderness: There is no abdominal tenderness.   Musculoskeletal:         General: Normal range of motion.      Cervical back: Normal range of motion and neck supple.      Right lower leg: No edema.      Left lower leg: No edema.   Lymphadenopathy:      Cervical: No cervical adenopathy.   Skin:     General: Skin is warm and dry.   Neurological:      General: No focal deficit present.      Comments: Foot exam is normal strong pedal pulses   Psychiatric:         Mood and Affect: Mood normal.         Behavior: Behavior normal.         Thought Content: Thought content normal.         " Judgment: Judgment normal.                    Signed Electronically by: CHANCE Wolf

## 2024-08-23 ENCOUNTER — TRANSFERRED RECORDS (OUTPATIENT)
Dept: MULTI SPECIALTY CLINIC | Facility: CLINIC | Age: 59
End: 2024-08-23

## 2024-08-23 LAB — RETINOPATHY: NORMAL

## 2024-12-30 ENCOUNTER — OFFICE VISIT (OUTPATIENT)
Dept: FAMILY MEDICINE | Facility: CLINIC | Age: 59
End: 2024-12-30
Payer: COMMERCIAL

## 2024-12-30 VITALS
RESPIRATION RATE: 18 BRPM | BODY MASS INDEX: 27.33 KG/M2 | TEMPERATURE: 98.1 F | WEIGHT: 206.2 LBS | HEIGHT: 73 IN | SYSTOLIC BLOOD PRESSURE: 130 MMHG | DIASTOLIC BLOOD PRESSURE: 86 MMHG | HEART RATE: 86 BPM | OXYGEN SATURATION: 97 %

## 2024-12-30 DIAGNOSIS — B02.9 HERPES ZOSTER WITHOUT COMPLICATION: ICD-10-CM

## 2024-12-30 DIAGNOSIS — Z12.11 SCREEN FOR COLON CANCER: Primary | ICD-10-CM

## 2024-12-30 DIAGNOSIS — F41.1 GAD (GENERALIZED ANXIETY DISORDER): ICD-10-CM

## 2024-12-30 DIAGNOSIS — E11.9 TYPE 2 DIABETES MELLITUS WITHOUT COMPLICATION, WITHOUT LONG-TERM CURRENT USE OF INSULIN (H): ICD-10-CM

## 2024-12-30 PROCEDURE — 99213 OFFICE O/P EST LOW 20 MIN: CPT | Performed by: PHYSICIAN ASSISTANT

## 2024-12-30 RX ORDER — ESCITALOPRAM OXALATE 10 MG/1
10 TABLET ORAL DAILY
Qty: 90 TABLET | Refills: 3 | Status: SHIPPED | OUTPATIENT
Start: 2024-12-30

## 2024-12-30 RX ORDER — VALACYCLOVIR HYDROCHLORIDE 1 G/1
1000 TABLET, FILM COATED ORAL 3 TIMES DAILY
Qty: 21 TABLET | Refills: 0 | Status: SHIPPED | OUTPATIENT
Start: 2024-12-30 | End: 2025-01-06

## 2024-12-30 NOTE — PROGRESS NOTES
"  Assessment & Plan     (Z12.11) Screen for colon cancer  (primary encounter diagnosis)  Comment: Will screen  Plan: COLOGUARD([employer])        Average risk    (E11.9) Type 2 diabetes mellitus without complication, without long-term current use of insulin (H)  Comment: Due for screening a couple months  Plan: Return at that time    (B02.9) Herpes zoster without complication  Comment: Discussed disease print out given  Plan: valACYclovir (VALTREX) 1000 mg tablet        Discussed postherpetic neuralgia    (F41.1) LUPE (generalized anxiety disorder)  Comment: Table doing well  Plan: escitalopram (LEXAPRO) 10 MG tablet        Renewed          BMI  Estimated body mass index is 27.58 kg/m  as calculated from the following:    Height as of this encounter: 1.842 m (6' 0.5\").    Weight as of this encounter: 93.5 kg (206 lb 3.2 oz).             Luis Deleon is a 59 year old, presenting for the following health issues:  Derm Problem (Rash with a burning sensation on right thigh approx for the past 1-2 weeks  )        12/30/2024     4:44 PM   Additional Questions   Roomed by TK Nicholas     Via the Health Maintenance questionnaire, the patient has reported the following services have been completed -Eye Exam: [facility] in [place] 2024-08-23, this information has been sent to the abstraction team.  (The clinic with complaint of some paresthesias to the lower back and lower abdomen without rash but now has developed a rash on the right buttocks and right posterior thigh rash started on Friday  No fever no chills    History of Present Illness       Reason for visit:  Rash on upper right leg, itchy back and stomic  Symptom onset:  3-7 days ago  Symptom intensity:  Moderate  Symptom progression:  Staying the same  Had these symptoms before:  No  What makes it worse:  Being active  What makes it better:  Ice   He is taking medications regularly.                     Objective    /86 (BP Location: Right arm, Patient " "Position: Sitting, Cuff Size: Adult Large)   Pulse 86   Temp 98.1  F (36.7  C) (Tympanic)   Resp 18   Ht 1.842 m (6' 0.5\")   Wt 93.5 kg (206 lb 3.2 oz)   SpO2 97%   BMI 27.58 kg/m    Body mass index is 27.58 kg/m .  Physical Exam classic herpetic rash on the right buttock and right posterior thigh without sign of secondary bacterial infection              Signed Electronically by: CHANCE Wolf    "

## 2025-02-10 ENCOUNTER — VIRTUAL VISIT (OUTPATIENT)
Dept: FAMILY MEDICINE | Facility: CLINIC | Age: 60
End: 2025-02-10
Payer: COMMERCIAL

## 2025-02-10 DIAGNOSIS — E11.9 TYPE 2 DIABETES MELLITUS WITHOUT COMPLICATION, WITHOUT LONG-TERM CURRENT USE OF INSULIN (H): ICD-10-CM

## 2025-02-10 DIAGNOSIS — J01.01 ACUTE RECURRENT MAXILLARY SINUSITIS: Primary | ICD-10-CM

## 2025-02-10 DIAGNOSIS — F33.1 MODERATE EPISODE OF RECURRENT MAJOR DEPRESSIVE DISORDER (H): ICD-10-CM

## 2025-02-10 PROCEDURE — 98013 SYNCH AUDIO-ONLY EST LOW 20: CPT | Performed by: PHYSICIAN ASSISTANT

## 2025-02-10 RX ORDER — BENZONATATE 200 MG/1
200 CAPSULE ORAL 3 TIMES DAILY PRN
Qty: 21 CAPSULE | Refills: 0 | Status: SHIPPED | OUTPATIENT
Start: 2025-02-10

## 2025-02-10 RX ORDER — DOXYCYCLINE HYCLATE 100 MG
100 TABLET ORAL 2 TIMES DAILY
Qty: 20 TABLET | Refills: 0 | Status: SHIPPED | OUTPATIENT
Start: 2025-02-10

## 2025-02-10 ASSESSMENT — PATIENT HEALTH QUESTIONNAIRE - PHQ9
SUM OF ALL RESPONSES TO PHQ QUESTIONS 1-9: 0
10. IF YOU CHECKED OFF ANY PROBLEMS, HOW DIFFICULT HAVE THESE PROBLEMS MADE IT FOR YOU TO DO YOUR WORK, TAKE CARE OF THINGS AT HOME, OR GET ALONG WITH OTHER PEOPLE: NOT DIFFICULT AT ALL
SUM OF ALL RESPONSES TO PHQ QUESTIONS 1-9: 0

## 2025-02-10 NOTE — PROGRESS NOTES
"Pancho is a 59 year old who is being evaluated via a billable telephone visit.    What phone number would you like to be contacted at? [phone]  How would you like to obtain your AVS? Mail a copy  Originating Location (pt. Location): Home    Distant Location (provider location):  On-site  Telephone visit completed due to the patient did not have access to video, while the distant provider did.    Assessment & Plan     (J01.01) Acute recurrent maxillary sinusitis  (primary encounter diagnosis)  Comment: Worsening  Plan: doxycycline hyclate (VIBRA-TABS) 100 MG tablet,        benzonatate (TESSALON) 200 MG capsule        Cover he should slowly improve not acutely worsen or he will recheck    (F33.1) Moderate episode of recurrent major depressive disorder (H)  Comment: Stable  Plan: Continue current medication    (E11.9) Type 2 diabetes mellitus without complication, without long-term current use of insulin (H)  Comment: Significant hyper hypoglycemia  Plan: Continue current treatment plan          BMI  Estimated body mass index is 27.58 kg/m  as calculated from the following:    Height as of 12/30/24: 1.842 m (6' 0.5\").    Weight as of 12/30/24: 93.5 kg (206 lb 3.2 oz).           Subjective   Pancho is a 59 year old, presenting for the following health issues:  URI (Patient having cough, shortness of breath at times, runny nose, decreased energy-very tired, body sweats/chills since Friday. Feels like he has been battling a cold on/off all winter. )      2/10/2025     3:42 PM   Additional Questions   Roomed by Miriam SCHMITZ   Accompanied by Self     Patient with history of hypertension type 2 diabetes presents virtually to discuss sinus symptoms and a cough  He states he has been congested off and on all winter but now worse over the past 5 to 7 days  He is felt feverish no documented fever has felt chilled cough is nonproductive  He had some shortness of breath during the night on Thursday that has been improved  Nasal " congestion waxes and wanes was worse for a while but slightly better today  No severe headache no stiff neck  No hyper or hypoglycemia    History of Present Illness       Reason for visit:  Illness  Symptom onset:  1-3 days ago  Symptoms include:  Cough  Symptom intensity:  Mild  Symptom progression:  Staying the same  Had these symptoms before:  Yes  Has tried/received treatment for these symptoms:  Yes  Previous treatment was successful:  Yes  Prior treatment description:  Antibiotic  What makes it worse:  Sleeping flat   He is taking medications regularly.                   Objective           Vitals:  No vitals were obtained today due to virtual visit.    Physical Exam   General: Alert and no distress //Respiratory: No audible wheeze, cough, or shortness of breath // Psychiatric:  Appropriate affect, tone, and pace of words            Phone call duration: 5 minutes  Signed Electronically by: CHANCE Wolf

## 2025-03-12 ENCOUNTER — OFFICE VISIT (OUTPATIENT)
Dept: FAMILY MEDICINE | Facility: CLINIC | Age: 60
End: 2025-03-12
Payer: COMMERCIAL

## 2025-03-12 VITALS
WEIGHT: 205.3 LBS | SYSTOLIC BLOOD PRESSURE: 128 MMHG | RESPIRATION RATE: 16 BRPM | DIASTOLIC BLOOD PRESSURE: 76 MMHG | HEIGHT: 73 IN | TEMPERATURE: 97.3 F | HEART RATE: 58 BPM | BODY MASS INDEX: 27.21 KG/M2 | OXYGEN SATURATION: 97 %

## 2025-03-12 DIAGNOSIS — I10 BENIGN ESSENTIAL HYPERTENSION: Primary | ICD-10-CM

## 2025-03-12 DIAGNOSIS — E11.9 TYPE 2 DIABETES MELLITUS WITHOUT COMPLICATION, WITHOUT LONG-TERM CURRENT USE OF INSULIN (H): ICD-10-CM

## 2025-03-12 LAB
EST. AVERAGE GLUCOSE BLD GHB EST-MCNC: 163 MG/DL
HBA1C MFR BLD: 7.3 % (ref 0–5.6)

## 2025-03-12 PROCEDURE — 83036 HEMOGLOBIN GLYCOSYLATED A1C: CPT | Performed by: PHYSICIAN ASSISTANT

## 2025-03-12 PROCEDURE — 3078F DIAST BP <80 MM HG: CPT | Performed by: PHYSICIAN ASSISTANT

## 2025-03-12 PROCEDURE — 99207 PR FOOT EXAM NO CHARGE: CPT | Performed by: PHYSICIAN ASSISTANT

## 2025-03-12 PROCEDURE — 36415 COLL VENOUS BLD VENIPUNCTURE: CPT | Performed by: PHYSICIAN ASSISTANT

## 2025-03-12 PROCEDURE — 99214 OFFICE O/P EST MOD 30 MIN: CPT | Performed by: PHYSICIAN ASSISTANT

## 2025-03-12 PROCEDURE — 3074F SYST BP LT 130 MM HG: CPT | Performed by: PHYSICIAN ASSISTANT

## 2025-03-12 RX ORDER — LISINOPRIL AND HYDROCHLOROTHIAZIDE 20; 25 MG/1; MG/1
1 TABLET ORAL DAILY
Qty: 90 TABLET | Refills: 3 | Status: SHIPPED | OUTPATIENT
Start: 2025-03-12

## 2025-03-12 RX ORDER — AMLODIPINE BESYLATE 5 MG/1
5 TABLET ORAL DAILY
Qty: 90 TABLET | Refills: 3 | Status: SHIPPED | OUTPATIENT
Start: 2025-03-12

## 2025-03-12 RX ORDER — METFORMIN HYDROCHLORIDE 500 MG/1
500 TABLET, EXTENDED RELEASE ORAL 3 TIMES DAILY
Qty: 270 TABLET | Refills: 1 | Status: SHIPPED | OUTPATIENT
Start: 2025-03-12

## 2025-03-12 RX ORDER — VALACYCLOVIR HYDROCHLORIDE 1 G/1
1000 TABLET, FILM COATED ORAL 3 TIMES DAILY
Qty: 21 TABLET | Refills: 0 | Status: SHIPPED | OUTPATIENT
Start: 2025-03-12

## 2025-03-12 ASSESSMENT — ANXIETY QUESTIONNAIRES
3. WORRYING TOO MUCH ABOUT DIFFERENT THINGS: NOT AT ALL
7. FEELING AFRAID AS IF SOMETHING AWFUL MIGHT HAPPEN: NOT AT ALL
GAD7 TOTAL SCORE: 0
4. TROUBLE RELAXING: NOT AT ALL
5. BEING SO RESTLESS THAT IT IS HARD TO SIT STILL: NOT AT ALL
GAD7 TOTAL SCORE: 0
1. FEELING NERVOUS, ANXIOUS, OR ON EDGE: NOT AT ALL
IF YOU CHECKED OFF ANY PROBLEMS ON THIS QUESTIONNAIRE, HOW DIFFICULT HAVE THESE PROBLEMS MADE IT FOR YOU TO DO YOUR WORK, TAKE CARE OF THINGS AT HOME, OR GET ALONG WITH OTHER PEOPLE: NOT DIFFICULT AT ALL
7. FEELING AFRAID AS IF SOMETHING AWFUL MIGHT HAPPEN: NOT AT ALL
2. NOT BEING ABLE TO STOP OR CONTROL WORRYING: NOT AT ALL
8. IF YOU CHECKED OFF ANY PROBLEMS, HOW DIFFICULT HAVE THESE MADE IT FOR YOU TO DO YOUR WORK, TAKE CARE OF THINGS AT HOME, OR GET ALONG WITH OTHER PEOPLE?: NOT DIFFICULT AT ALL
GAD7 TOTAL SCORE: 0
6. BECOMING EASILY ANNOYED OR IRRITABLE: NOT AT ALL

## 2025-03-12 NOTE — LETTER
March 12, 2025      Pancho Vasquez  [address] [place] [id number]        Dear ,    We are writing to inform you of your test results.    Your diabetes is still under good control.    Resulted Orders   HEMOGLOBIN A1C   Result Value Ref Range    Estimated Average Glucose 163 (H) <117 mg/dL    Hemoglobin A1C 7.3 (H) 0.0 - 5.6 %      Comment:      Normal <5.7%   Prediabetes 5.7-6.4%    Diabetes 6.5% or higher     Note: Adopted from ADA consensus guidelines.       If you have any questions or concerns, please call the clinic at the number listed above.       Sincerely,      CHANCE Graf    Electronically signed

## 2025-03-12 NOTE — PROGRESS NOTES
"  Assessment & Plan     (I10) Benign essential hypertension  (primary encounter diagnosis)  Comment: Controlled  Plan: HEMOGLOBIN A1C, amLODIPine (NORVASC) 5 MG         tablet, lisinopril-hydrochlorothiazide         (ZESTORETIC) 20-25 MG tablet        Current treatment plan medications renewed    (E11.9) Type 2 diabetes mellitus without complication, without long-term current use of insulin (H)  Comment: Has been controlled  Plan: HEMOGLOBIN A1C, metFORMIN (GLUCOPHAGE XR) 500         MG 24 hr tablet, FOOT EXAM        Labs pending    (      BMI  Estimated body mass index is 27.46 kg/m  as calculated from the following:    Height as of this encounter: 1.842 m (6' 0.5\").    Weight as of this encounter: 93.1 kg (205 lb 4.8 oz).             Luis Deleon is a 59 year old, presenting for the following health issues:  Diabetes, Hypertension, MH Follow Up (Anxiety), and Derm Problem (Rash on Right arm for the past couple of weeks)        3/12/2025     9:03 AM   Additional Questions   Roomed by TK Nicholas     With history of hypertension type 2 diabetes (clinic for routine check  He has no complaints or concerns  He has slight paresthesia in a foot this is stable  No real pain  He did have an eye exam in October everything was fine  He tolerates his medications without difficulty    History of Present Illness       Mental Health Follow-up:  Patient presents to follow-up on Anxiety.    Patient's anxiety since last visit has been:  Good  The patient is not having other symptoms associated with anxiety.  Any significant life events: No  Patient is not feeling anxious or having panic attacks.  Patient has no concerns about alcohol or drug use.    Diabetes:   He presents for follow up of diabetes.  He is checking home blood glucose a few times a month.   He checks blood glucose before meals.  Blood glucose is never over 200 and never under 70. He is aware of hypoglycemia symptoms including weakness.    He has no concerns " "regarding his diabetes at this time.  He is having numbness in feet.            Hypertension: He presents for follow up of hypertension.  He does not check blood pressure  regularly outside of the clinic. Outpatient blood pressures have not been over 140/90. He does not follow a low salt diet.     He eats 0-1 servings of fruits and vegetables daily.He consumes 1 sweetened beverage(s) daily.He exercises with enough effort to increase his heart rate 30 to 60 minutes per day.  He exercises with enough effort to increase his heart rate 4 days per week.   He is taking medications regularly.                    Objective    /76 (BP Location: Right arm, Patient Position: Sitting, Cuff Size: Adult Regular)   Pulse 58   Temp 97.3  F (36.3  C) (Tympanic)   Resp 16   Ht 1.842 m (6' 0.5\")   Wt 93.1 kg (205 lb 4.8 oz)   SpO2 97%   BMI 27.46 kg/m    Body mass index is 27.46 kg/m .  Physical Exam attentive no acute distress  Blood pressure is normal  Ears canals and drums normal  Oropharynx pink moist no injection no exudate  Neck supple no adenopathy  Lungs: Ventilated  Cardiovascular regular rate and rhythm  Feet monofilament 5/5 bilateral he has strong dorsalis pedal pulses there are no ulcers              Signed Electronically by: CHANCE Wolf    "

## 2025-09-03 ENCOUNTER — OFFICE VISIT (OUTPATIENT)
Dept: FAMILY MEDICINE | Facility: CLINIC | Age: 60
End: 2025-09-03
Payer: COMMERCIAL

## 2025-09-03 VITALS
HEART RATE: 84 BPM | BODY MASS INDEX: 26.6 KG/M2 | HEIGHT: 73 IN | OXYGEN SATURATION: 97 % | RESPIRATION RATE: 16 BRPM | WEIGHT: 200.7 LBS | DIASTOLIC BLOOD PRESSURE: 80 MMHG | SYSTOLIC BLOOD PRESSURE: 128 MMHG | TEMPERATURE: 97.2 F

## 2025-09-03 DIAGNOSIS — J32.0 LEFT MAXILLARY SINUSITIS: Primary | ICD-10-CM

## 2025-09-03 PROCEDURE — 3074F SYST BP LT 130 MM HG: CPT | Performed by: PHYSICIAN ASSISTANT

## 2025-09-03 PROCEDURE — 99213 OFFICE O/P EST LOW 20 MIN: CPT | Performed by: PHYSICIAN ASSISTANT

## 2025-09-03 PROCEDURE — 3079F DIAST BP 80-89 MM HG: CPT | Performed by: PHYSICIAN ASSISTANT

## 2025-09-03 RX ORDER — DOXYCYCLINE HYCLATE 100 MG
100 TABLET ORAL 2 TIMES DAILY
Qty: 20 TABLET | Refills: 0 | Status: SHIPPED | OUTPATIENT
Start: 2025-09-03